# Patient Record
Sex: MALE | Race: WHITE | NOT HISPANIC OR LATINO
[De-identification: names, ages, dates, MRNs, and addresses within clinical notes are randomized per-mention and may not be internally consistent; named-entity substitution may affect disease eponyms.]

---

## 2017-05-02 ENCOUNTER — APPOINTMENT (OUTPATIENT)
Dept: ORTHOPEDIC SURGERY | Facility: CLINIC | Age: 52
End: 2017-05-02

## 2019-06-18 ENCOUNTER — NON-APPOINTMENT (OUTPATIENT)
Age: 54
End: 2019-06-18

## 2019-06-18 ENCOUNTER — APPOINTMENT (OUTPATIENT)
Dept: INTERNAL MEDICINE | Facility: CLINIC | Age: 54
End: 2019-06-18
Payer: COMMERCIAL

## 2019-06-18 VITALS
WEIGHT: 194 LBS | SYSTOLIC BLOOD PRESSURE: 132 MMHG | HEIGHT: 69 IN | DIASTOLIC BLOOD PRESSURE: 88 MMHG | OXYGEN SATURATION: 97 % | BODY MASS INDEX: 28.73 KG/M2 | TEMPERATURE: 98.2 F | RESPIRATION RATE: 14 BRPM | HEART RATE: 82 BPM

## 2019-06-18 VITALS — DIASTOLIC BLOOD PRESSURE: 84 MMHG | SYSTOLIC BLOOD PRESSURE: 126 MMHG

## 2019-06-18 DIAGNOSIS — Z82.49 FAMILY HISTORY OF ISCHEMIC HEART DISEASE AND OTHER DISEASES OF THE CIRCULATORY SYSTEM: ICD-10-CM

## 2019-06-18 DIAGNOSIS — S00.93XA CONTUSION OF UNSPECIFIED PART OF HEAD, INITIAL ENCOUNTER: ICD-10-CM
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
## 2019-06-18 DIAGNOSIS — S20.219A CONTUSION OF UNSPECIFIED FRONT WALL OF THORAX, INITIAL ENCOUNTER: ICD-10-CM

## 2019-06-18 PROCEDURE — 99204 OFFICE O/P NEW MOD 45 MIN: CPT | Mod: 25

## 2019-06-18 PROCEDURE — 93000 ELECTROCARDIOGRAM COMPLETE: CPT

## 2019-06-18 NOTE — PHYSICAL EXAM
[No Acute Distress] : no acute distress [Well Developed] : well developed [Well Nourished] : well nourished [PERRL] : pupils equal round and reactive to light [Well-Appearing] : well-appearing [Normal Sclera/Conjunctiva] : normal sclera/conjunctiva [Normal Outer Ear/Nose] : the outer ears and nose were normal in appearance [EOMI] : extraocular movements intact [Normal Oropharynx] : the oropharynx was normal [Supple] : supple [No JVD] : no jugular venous distention [Thyroid Normal, No Nodules] : the thyroid was normal and there were no nodules present [No Lymphadenopathy] : no lymphadenopathy [No Respiratory Distress] : no respiratory distress  [Scattered Wheezes] : scattered wheezing was heard [No Accessory Muscle Use] : no accessory muscle use [Normal Rate] : normal rate  [Regular Rhythm] : with a regular rhythm [Normal S1, S2] : normal S1 and S2 [No Murmur] : no murmur heard [No Carotid Bruits] : no carotid bruits [No Abdominal Bruit] : a ~M bruit was not heard ~T in the abdomen [Pedal Pulses Present] : the pedal pulses are present [No Varicosities] : no varicosities [No Extremity Clubbing/Cyanosis] : no extremity clubbing/cyanosis [No Edema] : there was no peripheral edema [No Palpable Aorta] : no palpable aorta [Non Tender] : non-tender [Soft] : abdomen soft [Non-distended] : non-distended [No HSM] : no HSM [No Masses] : no abdominal mass palpated [Normal Bowel Sounds] : normal bowel sounds [Normal Posterior Cervical Nodes] : no posterior cervical lymphadenopathy [Normal Anterior Cervical Nodes] : no anterior cervical lymphadenopathy [No CVA Tenderness] : no CVA  tenderness [No Spinal Tenderness] : no spinal tenderness [No Joint Swelling] : no joint swelling [Grossly Normal Strength/Tone] : grossly normal strength/tone [No Rash] : no rash [Normal Gait] : normal gait [Deep Tendon Reflexes (DTR)] : deep tendon reflexes were 2+ and symmetric [No Focal Deficits] : no focal deficits [Coordination Grossly Intact] : coordination grossly intact [Normal Insight/Judgement] : insight and judgment were intact [Normal Affect] : the affect was normal
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
## 2019-06-18 NOTE — HEALTH RISK ASSESSMENT
[Good] : ~his/her~ current health as good [0] : 2) Feeling down, depressed, or hopeless: Not at all (0) [] : No

## 2019-10-29 ENCOUNTER — APPOINTMENT (OUTPATIENT)
Dept: INTERNAL MEDICINE | Facility: CLINIC | Age: 54
End: 2019-10-29
Payer: COMMERCIAL

## 2019-10-29 VITALS
SYSTOLIC BLOOD PRESSURE: 114 MMHG | OXYGEN SATURATION: 98 % | DIASTOLIC BLOOD PRESSURE: 74 MMHG | HEART RATE: 76 BPM | RESPIRATION RATE: 14 BRPM | WEIGHT: 188 LBS | HEIGHT: 69 IN | BODY MASS INDEX: 27.85 KG/M2 | TEMPERATURE: 100.9 F

## 2019-10-29 DIAGNOSIS — R19.7 DIARRHEA, UNSPECIFIED: ICD-10-CM

## 2019-10-29 DIAGNOSIS — R10.814 LEFT LOWER QUADRANT ABDOMINAL TENDERNESS: ICD-10-CM

## 2019-10-29 PROCEDURE — 99214 OFFICE O/P EST MOD 30 MIN: CPT | Mod: 25

## 2019-10-29 PROCEDURE — 36415 COLL VENOUS BLD VENIPUNCTURE: CPT

## 2019-10-29 NOTE — REVIEW OF SYSTEMS
[Fever] : fever [Chills] : chills [Abdominal Pain] : abdominal pain [Diarrhea] : diarrhea [Negative] : Heme/Lymph

## 2019-10-29 NOTE — HISTORY OF PRESENT ILLNESS
[FreeTextEntry8] : has diarrhea for 2 days fever chills\par mild abdominal pains\par no traveling no antibiotics

## 2019-10-29 NOTE — PHYSICAL EXAM
[No Acute Distress] : no acute distress [Well Nourished] : well nourished [Well Developed] : well developed [Well-Appearing] : well-appearing [Normal Sclera/Conjunctiva] : normal sclera/conjunctiva [PERRL] : pupils equal round and reactive to light [EOMI] : extraocular movements intact [Normal Outer Ear/Nose] : the outer ears and nose were normal in appearance [Normal Oropharynx] : the oropharynx was normal [Normal TMs] : both tympanic membranes were normal [No JVD] : no jugular venous distention [No Lymphadenopathy] : no lymphadenopathy [Supple] : supple [Thyroid Normal, No Nodules] : the thyroid was normal and there were no nodules present [No Respiratory Distress] : no respiratory distress  [No Accessory Muscle Use] : no accessory muscle use [Clear to Auscultation] : lungs were clear to auscultation bilaterally [Normal Rate] : normal rate  [Regular Rhythm] : with a regular rhythm [Normal S1, S2] : normal S1 and S2 [No Murmur] : no murmur heard [No Carotid Bruits] : no carotid bruits [No Abdominal Bruit] : a ~M bruit was not heard ~T in the abdomen [No Varicosities] : no varicosities [Pedal Pulses Present] : the pedal pulses are present [No Edema] : there was no peripheral edema [No Palpable Aorta] : no palpable aorta [No Extremity Clubbing/Cyanosis] : no extremity clubbing/cyanosis [Soft] : abdomen soft [Non-distended] : non-distended [No Masses] : no abdominal mass palpated [No HSM] : no HSM [Normal Bowel Sounds] : normal bowel sounds [LLQ] : in the left lower quadrant [Normal Supraclavicular Nodes] : no supraclavicular lymphadenopathy [Normal Posterior Cervical Nodes] : no posterior cervical lymphadenopathy [Normal Anterior Cervical Nodes] : no anterior cervical lymphadenopathy [No CVA Tenderness] : no CVA  tenderness [No Spinal Tenderness] : no spinal tenderness [No Joint Swelling] : no joint swelling [Grossly Normal Strength/Tone] : grossly normal strength/tone [No Rash] : no rash [Coordination Grossly Intact] : coordination grossly intact [No Focal Deficits] : no focal deficits [Normal Gait] : normal gait [Deep Tendon Reflexes (DTR)] : deep tendon reflexes were 2+ and symmetric [Speech Grossly Normal] : speech grossly normal [Memory Grossly Normal] : memory grossly normal [Normal Affect] : the affect was normal [Normal Mood] : the mood was normal [Normal Insight/Judgement] : insight and judgment were intact

## 2019-10-30 LAB
ALBUMIN SERPL ELPH-MCNC: 4.3 G/DL
ALP BLD-CCNC: 61 U/L
ALT SERPL-CCNC: 16 U/L
ANION GAP SERPL CALC-SCNC: 13 MMOL/L
AST SERPL-CCNC: 18 U/L
BASOPHILS # BLD AUTO: 0.03 K/UL
BASOPHILS NFR BLD AUTO: 0.3 %
BILIRUB SERPL-MCNC: 0.3 MG/DL
BUN SERPL-MCNC: 12 MG/DL
CALCIUM SERPL-MCNC: 9.3 MG/DL
CHLORIDE SERPL-SCNC: 100 MMOL/L
CO2 SERPL-SCNC: 24 MMOL/L
CREAT SERPL-MCNC: 1.09 MG/DL
EOSINOPHIL # BLD AUTO: 0.01 K/UL
EOSINOPHIL NFR BLD AUTO: 0.1 %
GLUCOSE SERPL-MCNC: 99 MG/DL
HCT VFR BLD CALC: 46.1 %
HGB BLD-MCNC: 14.6 G/DL
IMM GRANULOCYTES NFR BLD AUTO: 0.2 %
LYMPHOCYTES # BLD AUTO: 0.99 K/UL
LYMPHOCYTES NFR BLD AUTO: 10.7 %
MAN DIFF?: NORMAL
MCHC RBC-ENTMCNC: 27.8 PG
MCHC RBC-ENTMCNC: 31.7 GM/DL
MCV RBC AUTO: 87.8 FL
MONOCYTES # BLD AUTO: 0.69 K/UL
MONOCYTES NFR BLD AUTO: 7.5 %
NEUTROPHILS # BLD AUTO: 7.51 K/UL
NEUTROPHILS NFR BLD AUTO: 81.2 %
PLATELET # BLD AUTO: 218 K/UL
POTASSIUM SERPL-SCNC: 4.5 MMOL/L
PROT SERPL-MCNC: 7.1 G/DL
RBC # BLD: 5.25 M/UL
RBC # FLD: 13.3 %
SODIUM SERPL-SCNC: 137 MMOL/L
WBC # FLD AUTO: 9.25 K/UL

## 2019-10-31 LAB
C DIFF TOX GENS STL QL NAA+PROBE: NORMAL
CDIFF BY PCR: NOT DETECTED

## 2019-11-02 ENCOUNTER — MEDICATION RENEWAL (OUTPATIENT)
Age: 54
End: 2019-11-02

## 2019-11-02 DIAGNOSIS — A04.5 CAMPYLOBACTER ENTERITIS: ICD-10-CM

## 2019-11-04 LAB — BACTERIA STL CULT: ABNORMAL

## 2019-11-12 LAB — DEPRECATED O AND P PREP STL: NORMAL

## 2022-03-27 ENCOUNTER — NON-APPOINTMENT (OUTPATIENT)
Age: 57
End: 2022-03-27

## 2022-03-28 ENCOUNTER — NON-APPOINTMENT (OUTPATIENT)
Age: 57
End: 2022-03-28

## 2022-03-28 ENCOUNTER — APPOINTMENT (OUTPATIENT)
Dept: INTERNAL MEDICINE | Facility: CLINIC | Age: 57
End: 2022-03-28
Payer: COMMERCIAL

## 2022-03-28 VITALS
DIASTOLIC BLOOD PRESSURE: 80 MMHG | OXYGEN SATURATION: 98 % | HEART RATE: 81 BPM | SYSTOLIC BLOOD PRESSURE: 118 MMHG | HEIGHT: 69 IN | RESPIRATION RATE: 14 BRPM | BODY MASS INDEX: 26.96 KG/M2 | WEIGHT: 182 LBS

## 2022-03-28 PROCEDURE — 99396 PREV VISIT EST AGE 40-64: CPT | Mod: 25

## 2022-03-28 PROCEDURE — 93000 ELECTROCARDIOGRAM COMPLETE: CPT

## 2022-03-28 RX ORDER — ACETAMINOPHEN 500 MG/1
500 TABLET ORAL
Refills: 0 | Status: DISCONTINUED | COMMUNITY

## 2022-03-28 RX ORDER — FAMOTIDINE 20 MG/1
20 TABLET, FILM COATED ORAL
Refills: 0 | Status: DISCONTINUED | COMMUNITY

## 2022-03-28 RX ORDER — METOPROLOL TARTRATE 50 MG/1
50 TABLET, FILM COATED ORAL
Refills: 0 | Status: DISCONTINUED | COMMUNITY

## 2022-03-28 RX ORDER — PAREGORIC 2 MG/5ML
LIQUID ORAL
Refills: 0 | Status: DISCONTINUED | COMMUNITY

## 2022-03-28 RX ORDER — AZITHROMYCIN 500 MG/1
500 TABLET, FILM COATED ORAL DAILY
Qty: 3 | Refills: 0 | Status: DISCONTINUED | COMMUNITY
Start: 2019-11-02 | End: 2022-03-28

## 2022-03-28 RX ORDER — CHOLESTYRAMINE 4 G/9G
4 POWDER, FOR SUSPENSION ORAL
Refills: 0 | Status: DISCONTINUED | COMMUNITY

## 2022-03-28 RX ORDER — CHLORDIAZEPOXIDE HYDROCHLORIDE AND CLIDINIUM BROMIDE 5; 2.5 MG/1; MG/1
5-2.5 CAPSULE ORAL
Refills: 0 | Status: DISCONTINUED | COMMUNITY

## 2022-03-28 RX ORDER — CEPHALEXIN 250 MG/1
250 CAPSULE ORAL
Refills: 0 | Status: DISCONTINUED | COMMUNITY

## 2022-03-28 RX ORDER — ALPRAZOLAM 1 MG/1
1 TABLET ORAL
Refills: 0 | Status: DISCONTINUED | COMMUNITY

## 2022-03-28 RX ORDER — CYCLOSPORINE 0.5 MG/ML
0.05 EMULSION OPHTHALMIC
Refills: 0 | Status: DISCONTINUED | COMMUNITY

## 2022-03-28 RX ORDER — MORPHINE SULFATE 10 MG/5 ML
20 SOLUTION, ORAL ORAL
Refills: 0 | Status: DISCONTINUED | COMMUNITY

## 2022-03-28 RX ORDER — PREGABALIN 50 MG/1
50 CAPSULE ORAL
Refills: 0 | Status: DISCONTINUED | COMMUNITY

## 2022-03-28 RX ORDER — PAROXETINE HYDROCHLORIDE 20 MG/1
20 TABLET, FILM COATED ORAL
Refills: 0 | Status: DISCONTINUED | COMMUNITY

## 2022-03-28 RX ORDER — FOLIC ACID 1 MG/1
1 TABLET ORAL
Refills: 0 | Status: DISCONTINUED | COMMUNITY

## 2022-03-28 RX ORDER — TEGASEROD 6 MG/1
6 TABLET ORAL
Refills: 0 | Status: DISCONTINUED | COMMUNITY

## 2022-03-28 RX ORDER — CAMPHOR 0.45 %
25 GEL (GRAM) TOPICAL
Refills: 0 | Status: DISCONTINUED | COMMUNITY

## 2022-03-28 RX ORDER — CHLORHEXIDINE GLUCONATE 4 %
1000 LIQUID (ML) TOPICAL
Refills: 0 | Status: DISCONTINUED | COMMUNITY

## 2022-03-28 RX ORDER — GABAPENTIN 600 MG/1
600 TABLET, COATED ORAL
Refills: 0 | Status: DISCONTINUED | COMMUNITY

## 2022-03-28 RX ORDER — LINACLOTIDE 145 UG/1
145 CAPSULE, GELATIN COATED ORAL
Refills: 0 | Status: DISCONTINUED | COMMUNITY

## 2022-03-28 RX ORDER — TIZANIDINE 2 MG/1
2 TABLET ORAL
Refills: 0 | Status: DISCONTINUED | COMMUNITY

## 2022-03-28 RX ORDER — VALSARTAN 160 MG/1
160 TABLET, COATED ORAL
Refills: 0 | Status: DISCONTINUED | COMMUNITY

## 2022-03-28 RX ORDER — ONDANSETRON HYDROCHLORIDE 4 MG/1
4 TABLET, FILM COATED ORAL
Refills: 0 | Status: DISCONTINUED | COMMUNITY

## 2022-03-28 RX ORDER — AMINO ACIDS/PROTEIN HYDROLYS 15G-100/30
LIQUID (ML) ORAL
Refills: 0 | Status: DISCONTINUED | COMMUNITY

## 2022-03-28 RX ORDER — THIAMINE HCL 100 MG
500 TABLET ORAL
Refills: 0 | Status: DISCONTINUED | COMMUNITY

## 2022-03-28 NOTE — HISTORY OF PRESENT ILLNESS
[FreeTextEntry1] : Here to reestablish care [de-identified] : MARIA EMAT CUNHA is a 57 year old M who presents today for annual physical

## 2022-03-28 NOTE — HEALTH RISK ASSESSMENT
[Good] : ~his/her~  mood as  good [Never] : Never [Yes] : Yes [Monthly or less (1 pt)] : Monthly or less (1 point) [1 or 2 (0 pts)] : 1 or 2 (0 points) [Never (0 pts)] : Never (0 points) [No] : In the past 12 months have you used drugs other than those required for medical reasons? No [No falls in past year] : Patient reported no falls in the past year [0] : 2) Feeling down, depressed, or hopeless: Not at all (0) [Patient refused screening] : Patient refused screening [PHQ-2 Negative - No further assessment needed] : PHQ-2 Negative - No further assessment needed [[id number]] : 0

## 2022-03-28 NOTE — PHYSICAL EXAM
[No Acute Distress] : no acute distress [Well Nourished] : well nourished [Well Developed] : well developed [Well-Appearing] : well-appearing [Normal Voice/Communication] : normal voice/communication [Normal Sclera/Conjunctiva] : normal sclera/conjunctiva [PERRL] : pupils equal round and reactive to light [EOMI] : extraocular movements intact [Normal Outer Ear/Nose] : the outer ears and nose were normal in appearance [Normal Oropharynx] : the oropharynx was normal [Normal TMs] : both tympanic membranes were normal [No JVD] : no jugular venous distention [No Lymphadenopathy] : no lymphadenopathy [Supple] : supple [Thyroid Normal, No Nodules] : the thyroid was normal and there were no nodules present [No Respiratory Distress] : no respiratory distress  [No Accessory Muscle Use] : no accessory muscle use [Clear to Auscultation] : lungs were clear to auscultation bilaterally [Normal Rate] : normal rate  [Regular Rhythm] : with a regular rhythm [Normal S1, S2] : normal S1 and S2 [No Murmur] : no murmur heard [No Carotid Bruits] : no carotid bruits [No Abdominal Bruit] : a ~M bruit was not heard ~T in the abdomen [No Varicosities] : no varicosities [Pedal Pulses Present] : the pedal pulses are present [No Edema] : there was no peripheral edema [No Palpable Aorta] : no palpable aorta [No Extremity Clubbing/Cyanosis] : no extremity clubbing/cyanosis [Normal Appearance] : normal in appearance [Soft] : abdomen soft [Non Tender] : non-tender [Non-distended] : non-distended [No Masses] : no abdominal mass palpated [No HSM] : no HSM [Normal Bowel Sounds] : normal bowel sounds [No Hernias] : no hernias [Normal Sphincter Tone] : normal sphincter tone [No Mass] : no mass [Penis Abnormality] : normal uncircumcised penis [Scrotum] : the scrotum was normal [Testes Tenderness] : no tenderness of the testes [Testes Mass (___cm)] : there were no testicular masses [Prostate Enlargement] : the prostate was not enlarged [Prostate Tenderness] : the prostate was not tender [No Prostate Nodules] : no prostate nodules [Normal Axillary Nodes] : no axillary lymphadenopathy [Normal Posterior Cervical Nodes] : no posterior cervical lymphadenopathy [Normal Anterior Cervical Nodes] : no anterior cervical lymphadenopathy [Normal Inguinal Nodes] : no inguinal lymphadenopathy [Normal Femoral Nodes] : no femoral lymphadenopathy [No CVA Tenderness] : no CVA  tenderness [No Spinal Tenderness] : no spinal tenderness [No Joint Swelling] : no joint swelling [Grossly Normal Strength/Tone] : grossly normal strength/tone [No Rash] : no rash [Coordination Grossly Intact] : coordination grossly intact [No Focal Deficits] : no focal deficits [Normal Gait] : normal gait [Deep Tendon Reflexes (DTR)] : deep tendon reflexes were 2+ and symmetric [Speech Grossly Normal] : speech grossly normal [Memory Grossly Normal] : memory grossly normal [Normal Affect] : the affect was normal [Alert and Oriented x3] : oriented to person, place, and time [Normal Mood] : the mood was normal [Normal Insight/Judgement] : insight and judgment were intact

## 2022-03-29 DIAGNOSIS — E55.9 VITAMIN D DEFICIENCY, UNSPECIFIED: ICD-10-CM

## 2022-03-29 LAB
25(OH)D3 SERPL-MCNC: 27.6 NG/ML
ALBUMIN SERPL ELPH-MCNC: 4.7 G/DL
ALP BLD-CCNC: 62 U/L
ALT SERPL-CCNC: 27 U/L
ANION GAP SERPL CALC-SCNC: 11 MMOL/L
APPEARANCE: CLEAR
AST SERPL-CCNC: 20 U/L
BACTERIA: NEGATIVE
BASOPHILS # BLD AUTO: 0.05 K/UL
BASOPHILS NFR BLD AUTO: 1.4 %
BILIRUB SERPL-MCNC: 0.7 MG/DL
BILIRUBIN URINE: NEGATIVE
BLOOD URINE: NEGATIVE
BUN SERPL-MCNC: 14 MG/DL
CALCIUM SERPL-MCNC: 9.5 MG/DL
CHLORIDE SERPL-SCNC: 103 MMOL/L
CHOLEST SERPL-MCNC: 266 MG/DL
CK SERPL-CCNC: 116 U/L
CO2 SERPL-SCNC: 28 MMOL/L
COLOR: YELLOW
CREAT SERPL-MCNC: 0.82 MG/DL
EGFR: 104 ML/MIN/1.73M2
EOSINOPHIL # BLD AUTO: 0.07 K/UL
EOSINOPHIL NFR BLD AUTO: 1.9 %
ESTIMATED AVERAGE GLUCOSE: 105 MG/DL
GLUCOSE QUALITATIVE U: NEGATIVE
GLUCOSE SERPL-MCNC: 88 MG/DL
HBA1C MFR BLD HPLC: 5.3 %
HCT VFR BLD CALC: 45.9 %
HDLC SERPL-MCNC: 61 MG/DL
HEMOCCULT STL QL IA: NEGATIVE
HGB BLD-MCNC: 15.2 G/DL
HYALINE CASTS: 0 /LPF
IMM GRANULOCYTES NFR BLD AUTO: 0 %
KETONES URINE: NEGATIVE
LDLC SERPL CALC-MCNC: 186 MG/DL
LEUKOCYTE ESTERASE URINE: NEGATIVE
LYMPHOCYTES # BLD AUTO: 0.99 K/UL
LYMPHOCYTES NFR BLD AUTO: 26.8 %
MAGNESIUM SERPL-MCNC: 2.3 MG/DL
MAN DIFF?: NORMAL
MCHC RBC-ENTMCNC: 28.8 PG
MCHC RBC-ENTMCNC: 33.1 GM/DL
MCV RBC AUTO: 86.9 FL
MICROSCOPIC-UA: NORMAL
MONOCYTES # BLD AUTO: 0.32 K/UL
MONOCYTES NFR BLD AUTO: 8.6 %
NEUTROPHILS # BLD AUTO: 2.27 K/UL
NEUTROPHILS NFR BLD AUTO: 61.3 %
NITRITE URINE: NEGATIVE
NONHDLC SERPL-MCNC: 206 MG/DL
PH URINE: 7.5
PLATELET # BLD AUTO: 238 K/UL
POTASSIUM SERPL-SCNC: 4.4 MMOL/L
PROT SERPL-MCNC: 7.3 G/DL
PROTEIN URINE: NEGATIVE
PSA SERPL-MCNC: 1.4 NG/ML
RBC # BLD: 5.28 M/UL
RBC # FLD: 13.1 %
RED BLOOD CELLS URINE: 1 /HPF
SODIUM SERPL-SCNC: 142 MMOL/L
SPECIFIC GRAVITY URINE: 1.02
SQUAMOUS EPITHELIAL CELLS: 0 /HPF
TESTOST SERPL-MCNC: 395 NG/DL
TRIGL SERPL-MCNC: 99 MG/DL
TSH SERPL-ACNC: 0.99 UIU/ML
UROBILINOGEN URINE: NORMAL
WBC # FLD AUTO: 3.7 K/UL
WHITE BLOOD CELLS URINE: 1 /HPF

## 2022-03-29 RX ORDER — CHROMIUM 200 MCG
25 MCG TABLET ORAL
Refills: 0 | Status: ACTIVE | COMMUNITY

## 2022-03-31 LAB — ZINC SERPL-MCNC: 110 UG/DL

## 2022-06-28 ENCOUNTER — RX RENEWAL (OUTPATIENT)
Age: 57
End: 2022-06-28

## 2022-08-15 DIAGNOSIS — D72.819 DECREASED WHITE BLOOD CELL COUNT, UNSPECIFIED: ICD-10-CM

## 2022-08-15 LAB
25(OH)D3 SERPL-MCNC: 77.3 NG/ML
ALBUMIN SERPL ELPH-MCNC: 4.6 G/DL
ALP BLD-CCNC: 62 U/L
ALT SERPL-CCNC: 23 U/L
ANION GAP SERPL CALC-SCNC: 11 MMOL/L
AST SERPL-CCNC: 19 U/L
BASOPHILS # BLD AUTO: 0.04 K/UL
BASOPHILS NFR BLD AUTO: 1.1 %
BILIRUB SERPL-MCNC: 0.7 MG/DL
BUN SERPL-MCNC: 12 MG/DL
CALCIUM SERPL-MCNC: 9.4 MG/DL
CHLORIDE SERPL-SCNC: 104 MMOL/L
CHOLEST SERPL-MCNC: 227 MG/DL
CK SERPL-CCNC: 110 U/L
CO2 SERPL-SCNC: 27 MMOL/L
CREAT SERPL-MCNC: 0.8 MG/DL
EGFR: 105 ML/MIN/1.73M2
EOSINOPHIL # BLD AUTO: 0.14 K/UL
EOSINOPHIL NFR BLD AUTO: 3.8 %
GLUCOSE SERPL-MCNC: 89 MG/DL
HCT VFR BLD CALC: 47.9 %
HDLC SERPL-MCNC: 56 MG/DL
HGB BLD-MCNC: 15.8 G/DL
IMM GRANULOCYTES NFR BLD AUTO: 0.3 %
LDLC SERPL CALC-MCNC: 151 MG/DL
LYMPHOCYTES # BLD AUTO: 1.07 K/UL
LYMPHOCYTES NFR BLD AUTO: 28.9 %
MAN DIFF?: NORMAL
MCHC RBC-ENTMCNC: 28.8 PG
MCHC RBC-ENTMCNC: 33 GM/DL
MCV RBC AUTO: 87.4 FL
MONOCYTES # BLD AUTO: 0.31 K/UL
MONOCYTES NFR BLD AUTO: 8.4 %
NEUTROPHILS # BLD AUTO: 2.13 K/UL
NEUTROPHILS NFR BLD AUTO: 57.5 %
NONHDLC SERPL-MCNC: 171 MG/DL
PLATELET # BLD AUTO: 238 K/UL
POTASSIUM SERPL-SCNC: 4.1 MMOL/L
PROT SERPL-MCNC: 7.1 G/DL
RBC # BLD: 5.48 M/UL
RBC # FLD: 12.3 %
SODIUM SERPL-SCNC: 142 MMOL/L
TRIGL SERPL-MCNC: 97 MG/DL
WBC # FLD AUTO: 3.7 K/UL

## 2023-04-24 ENCOUNTER — APPOINTMENT (OUTPATIENT)
Dept: INTERNAL MEDICINE | Facility: CLINIC | Age: 58
End: 2023-04-24
Payer: COMMERCIAL

## 2023-04-24 ENCOUNTER — NON-APPOINTMENT (OUTPATIENT)
Age: 58
End: 2023-04-24

## 2023-04-24 VITALS
RESPIRATION RATE: 14 BRPM | OXYGEN SATURATION: 95 % | SYSTOLIC BLOOD PRESSURE: 116 MMHG | HEIGHT: 69 IN | TEMPERATURE: 98.9 F | DIASTOLIC BLOOD PRESSURE: 74 MMHG | HEART RATE: 72 BPM | BODY MASS INDEX: 27.55 KG/M2 | WEIGHT: 186 LBS

## 2023-04-24 DIAGNOSIS — Z00.00 ENCOUNTER FOR GENERAL ADULT MEDICAL EXAMINATION W/OUT ABNORMAL FINDINGS: ICD-10-CM

## 2023-04-24 DIAGNOSIS — Z12.11 ENCOUNTER FOR SCREENING FOR MALIGNANT NEOPLASM OF COLON: ICD-10-CM

## 2023-04-24 PROCEDURE — 93000 ELECTROCARDIOGRAM COMPLETE: CPT | Mod: 59

## 2023-04-24 PROCEDURE — 99396 PREV VISIT EST AGE 40-64: CPT | Mod: 25

## 2023-04-24 NOTE — PLAN
[FreeTextEntry1] : continue medications \par further instructions pending lab results \par advised to get a Colonoscopy

## 2023-04-24 NOTE — HISTORY OF PRESENT ILLNESS
[FreeTextEntry1] : annual physical  [de-identified] : URSULA CUNHA is a 58 year old M who presents today for a physical. Pt has a history of HLD. Pt ran out and stopped taking cholesterol medications noticed blood after ejaculation

## 2023-04-24 NOTE — END OF VISIT
[FreeTextEntry3] : "I, Ashley Camacho, personally scribed the services dictated to me by Dr. Alfredo Paulson MD in this documentation on 04/24/2023 " \par \par "I Dr. Alfredo Paulson MD, personally performed the services described in this documentation on 04/24/2023 for the patient as scribed by Ashley Camacho in my presence. I have reviewed and verified that all the information is accurate and true."

## 2023-04-24 NOTE — PHYSICAL EXAM
[No Acute Distress] : no acute distress [Well Nourished] : well nourished [Well Developed] : well developed [Well-Appearing] : well-appearing [Normal Voice/Communication] : normal voice/communication [Normal Sclera/Conjunctiva] : normal sclera/conjunctiva [PERRL] : pupils equal round and reactive to light [EOMI] : extraocular movements intact [Normal Outer Ear/Nose] : the outer ears and nose were normal in appearance [Normal Oropharynx] : the oropharynx was normal [No JVD] : no jugular venous distention [No Lymphadenopathy] : no lymphadenopathy [Supple] : supple [Thyroid Normal, No Nodules] : the thyroid was normal and there were no nodules present [No Respiratory Distress] : no respiratory distress  [No Accessory Muscle Use] : no accessory muscle use [Clear to Auscultation] : lungs were clear to auscultation bilaterally [Normal Rate] : normal rate  [Regular Rhythm] : with a regular rhythm [Normal S1, S2] : normal S1 and S2 [No Murmur] : no murmur heard [No Carotid Bruits] : no carotid bruits [No Abdominal Bruit] : a ~M bruit was not heard ~T in the abdomen [No Varicosities] : no varicosities [Pedal Pulses Present] : the pedal pulses are present [No Edema] : there was no peripheral edema [No Palpable Aorta] : no palpable aorta [No Extremity Clubbing/Cyanosis] : no extremity clubbing/cyanosis [Soft] : abdomen soft [Non Tender] : non-tender [Non-distended] : non-distended [No HSM] : no HSM [Normal Bowel Sounds] : normal bowel sounds [Normal Supraclavicular Nodes] : no supraclavicular lymphadenopathy [Normal Posterior Cervical Nodes] : no posterior cervical lymphadenopathy [Normal Anterior Cervical Nodes] : no anterior cervical lymphadenopathy [No CVA Tenderness] : no CVA  tenderness [No Joint Swelling] : no joint swelling [No Spinal Tenderness] : no spinal tenderness [Grossly Normal Strength/Tone] : grossly normal strength/tone [Coordination Grossly Intact] : coordination grossly intact [No Rash] : no rash [No Focal Deficits] : no focal deficits [Normal Gait] : normal gait [Speech Grossly Normal] : speech grossly normal [Deep Tendon Reflexes (DTR)] : deep tendon reflexes were 2+ and symmetric [Memory Grossly Normal] : memory grossly normal [Normal Affect] : the affect was normal [Alert and Oriented x3] : oriented to person, place, and time [Normal Mood] : the mood was normal [Normal Insight/Judgement] : insight and judgment were intact [Normal Appearance] : normal in appearance [No Masses] : no palpable masses [No Hernias] : no hernias [Normal Sphincter Tone] : normal sphincter tone [No Mass] : no mass [Penis Abnormality] : normal circumcised penis [Scrotum] : the scrotum was normal [Testes Tenderness] : no tenderness of the testes [Testes Mass (___cm)] : there were no testicular masses [Prostate Enlargement] : the prostate was not enlarged [Prostate Tenderness] : the prostate was not tender [No Prostate Nodules] : no prostate nodules [Normal Axillary Nodes] : no axillary lymphadenopathy [Normal Inguinal Nodes] : no inguinal lymphadenopathy [Normal Femoral Nodes] : no femoral lymphadenopathy

## 2023-04-24 NOTE — HEALTH RISK ASSESSMENT
[Good] : ~his/her~  mood as  good [2 - 4 times a month (2 pts)] : 2-4 times a month (2 points) [1 or 2 (0 pts)] : 1 or 2 (0 points) [Never (0 pts)] : Never (0 points) [No] : In the past 12 months have you used drugs other than those required for medical reasons? No [No falls in past year] : Patient reported no falls in the past year [0] : 2) Feeling down, depressed, or hopeless: Not at all (0) [PHQ-2 Negative - No further assessment needed] : PHQ-2 Negative - No further assessment needed [Never] : Never [[id number]] : 0

## 2023-04-26 LAB
25(OH)D3 SERPL-MCNC: 87.6 NG/ML
ALBUMIN SERPL ELPH-MCNC: 4.6 G/DL
ALP BLD-CCNC: 63 U/L
ALT SERPL-CCNC: 19 U/L
ANION GAP SERPL CALC-SCNC: 11 MMOL/L
APPEARANCE: CLEAR
AST SERPL-CCNC: 21 U/L
BACTERIA: NEGATIVE /HPF
BASOPHILS # BLD AUTO: 0.06 K/UL
BASOPHILS NFR BLD AUTO: 1.4 %
BILIRUB SERPL-MCNC: 0.8 MG/DL
BILIRUBIN URINE: NEGATIVE
BLOOD URINE: NEGATIVE
BUN SERPL-MCNC: 14 MG/DL
CALCIUM SERPL-MCNC: 9.8 MG/DL
CAST: 0 /LPF
CHLORIDE SERPL-SCNC: 104 MMOL/L
CHOLEST SERPL-MCNC: 294 MG/DL
CK SERPL-CCNC: 162 U/L
CO2 SERPL-SCNC: 27 MMOL/L
COLOR: NORMAL
CREAT SERPL-MCNC: 0.93 MG/DL
EGFR: 96 ML/MIN/1.73M2
EOSINOPHIL # BLD AUTO: 0.15 K/UL
EOSINOPHIL NFR BLD AUTO: 3.6 %
EPITHELIAL CELLS: 0 /HPF
ESTIMATED AVERAGE GLUCOSE: 105 MG/DL
GLUCOSE QUALITATIVE U: NEGATIVE MG/DL
GLUCOSE SERPL-MCNC: 101 MG/DL
HBA1C MFR BLD HPLC: 5.3 %
HCT VFR BLD CALC: 49.2 %
HDLC SERPL-MCNC: 64 MG/DL
HEMOCCULT STL QL IA: NEGATIVE
HGB BLD-MCNC: 15.5 G/DL
IMM GRANULOCYTES NFR BLD AUTO: 0.2 %
KETONES URINE: ABNORMAL MG/DL
LDLC SERPL CALC-MCNC: 206 MG/DL
LEUKOCYTE ESTERASE URINE: ABNORMAL
LYMPHOCYTES # BLD AUTO: 1.19 K/UL
LYMPHOCYTES NFR BLD AUTO: 28.7 %
MAN DIFF?: NORMAL
MCHC RBC-ENTMCNC: 28.2 PG
MCHC RBC-ENTMCNC: 31.5 GM/DL
MCV RBC AUTO: 89.5 FL
MICROSCOPIC-UA: NORMAL
MONOCYTES # BLD AUTO: 0.39 K/UL
MONOCYTES NFR BLD AUTO: 9.4 %
NEUTROPHILS # BLD AUTO: 2.34 K/UL
NEUTROPHILS NFR BLD AUTO: 56.7 %
NITRITE URINE: NEGATIVE
NONHDLC SERPL-MCNC: 230 MG/DL
PH URINE: 6
PLATELET # BLD AUTO: 259 K/UL
POTASSIUM SERPL-SCNC: 5.2 MMOL/L
PROT SERPL-MCNC: 7.2 G/DL
PROTEIN URINE: NORMAL MG/DL
PSA SERPL-MCNC: 1.6 NG/ML
RBC # BLD: 5.5 M/UL
RBC # FLD: 12.9 %
RED BLOOD CELLS URINE: 1 /HPF
SODIUM SERPL-SCNC: 141 MMOL/L
SPECIFIC GRAVITY URINE: 1.03
TRIGL SERPL-MCNC: 119 MG/DL
TSH SERPL-ACNC: 0.95 UIU/ML
UROBILINOGEN URINE: 0.2 MG/DL
WBC # FLD AUTO: 4.14 K/UL
WHITE BLOOD CELLS URINE: 2 /HPF

## 2023-10-01 RX ORDER — ATORVASTATIN CALCIUM 10 MG/1
10 TABLET, FILM COATED ORAL DAILY
Qty: 30 | Refills: 1 | Status: ACTIVE | COMMUNITY
Start: 2022-08-15 | End: 1900-01-01

## 2024-03-16 ENCOUNTER — EMERGENCY (EMERGENCY)
Facility: HOSPITAL | Age: 59
LOS: 1 days | Discharge: HOME | End: 2024-03-16
Attending: EMERGENCY MEDICINE | Admitting: EMERGENCY MEDICINE
Payer: COMMERCIAL

## 2024-03-16 VITALS
SYSTOLIC BLOOD PRESSURE: 133 MMHG | OXYGEN SATURATION: 94 % | RESPIRATION RATE: 20 BRPM | WEIGHT: 179.9 LBS | DIASTOLIC BLOOD PRESSURE: 90 MMHG | HEART RATE: 104 BPM | HEIGHT: 70 IN

## 2024-03-16 VITALS
TEMPERATURE: 99 F | OXYGEN SATURATION: 97 % | HEART RATE: 75 BPM | DIASTOLIC BLOOD PRESSURE: 96 MMHG | SYSTOLIC BLOOD PRESSURE: 145 MMHG | RESPIRATION RATE: 18 BRPM

## 2024-03-16 LAB
ALBUMIN SERPL ELPH-MCNC: 3.5 G/DL — SIGNIFICANT CHANGE UP (ref 3.3–5)
ALP SERPL-CCNC: 60 U/L — SIGNIFICANT CHANGE UP (ref 40–120)
ALT FLD-CCNC: 42 U/L — SIGNIFICANT CHANGE UP (ref 12–78)
ANION GAP SERPL CALC-SCNC: 8 MMOL/L — SIGNIFICANT CHANGE UP (ref 5–17)
APTT BLD: 27.3 SEC — SIGNIFICANT CHANGE UP (ref 24.5–35.6)
AST SERPL-CCNC: 27 U/L — SIGNIFICANT CHANGE UP (ref 15–37)
BASOPHILS # BLD AUTO: 0.03 K/UL — SIGNIFICANT CHANGE UP (ref 0–0.2)
BASOPHILS NFR BLD AUTO: 0.4 % — SIGNIFICANT CHANGE UP (ref 0–2)
BILIRUB SERPL-MCNC: 0.4 MG/DL — SIGNIFICANT CHANGE UP (ref 0.2–1.2)
BUN SERPL-MCNC: 17 MG/DL — SIGNIFICANT CHANGE UP (ref 7–23)
CALCIUM SERPL-MCNC: 9.2 MG/DL — SIGNIFICANT CHANGE UP (ref 8.5–10.1)
CHLORIDE SERPL-SCNC: 106 MMOL/L — SIGNIFICANT CHANGE UP (ref 96–108)
CO2 SERPL-SCNC: 29 MMOL/L — SIGNIFICANT CHANGE UP (ref 22–31)
CREAT SERPL-MCNC: 1 MG/DL — SIGNIFICANT CHANGE UP (ref 0.5–1.3)
EGFR: 88 ML/MIN/1.73M2 — SIGNIFICANT CHANGE UP
EOSINOPHIL # BLD AUTO: 0.01 K/UL — SIGNIFICANT CHANGE UP (ref 0–0.5)
EOSINOPHIL NFR BLD AUTO: 0.1 % — SIGNIFICANT CHANGE UP (ref 0–6)
GLUCOSE SERPL-MCNC: 92 MG/DL — SIGNIFICANT CHANGE UP (ref 70–99)
HCT VFR BLD CALC: 45.6 % — SIGNIFICANT CHANGE UP (ref 39–50)
HGB BLD-MCNC: 14.9 G/DL — SIGNIFICANT CHANGE UP (ref 13–17)
IMM GRANULOCYTES NFR BLD AUTO: 0.1 % — SIGNIFICANT CHANGE UP (ref 0–0.9)
INR BLD: 1.04 RATIO — SIGNIFICANT CHANGE UP (ref 0.85–1.18)
LACTATE SERPL-SCNC: 1.3 MMOL/L — SIGNIFICANT CHANGE UP (ref 0.7–2)
LYMPHOCYTES # BLD AUTO: 1.29 K/UL — SIGNIFICANT CHANGE UP (ref 1–3.3)
LYMPHOCYTES # BLD AUTO: 18.7 % — SIGNIFICANT CHANGE UP (ref 13–44)
MCHC RBC-ENTMCNC: 28.2 PG — SIGNIFICANT CHANGE UP (ref 27–34)
MCHC RBC-ENTMCNC: 32.7 GM/DL — SIGNIFICANT CHANGE UP (ref 32–36)
MCV RBC AUTO: 86.4 FL — SIGNIFICANT CHANGE UP (ref 80–100)
MONOCYTES # BLD AUTO: 0.76 K/UL — SIGNIFICANT CHANGE UP (ref 0–0.9)
MONOCYTES NFR BLD AUTO: 11 % — SIGNIFICANT CHANGE UP (ref 2–14)
NEUTROPHILS # BLD AUTO: 4.81 K/UL — SIGNIFICANT CHANGE UP (ref 1.8–7.4)
NEUTROPHILS NFR BLD AUTO: 69.7 % — SIGNIFICANT CHANGE UP (ref 43–77)
NRBC # BLD: 0 /100 WBCS — SIGNIFICANT CHANGE UP (ref 0–0)
PLATELET # BLD AUTO: 208 K/UL — SIGNIFICANT CHANGE UP (ref 150–400)
POTASSIUM SERPL-MCNC: 3.8 MMOL/L — SIGNIFICANT CHANGE UP (ref 3.5–5.3)
POTASSIUM SERPL-SCNC: 3.8 MMOL/L — SIGNIFICANT CHANGE UP (ref 3.5–5.3)
PROT SERPL-MCNC: 7.7 G/DL — SIGNIFICANT CHANGE UP (ref 6–8.3)
PROTHROM AB SERPL-ACNC: 12.2 SEC — SIGNIFICANT CHANGE UP (ref 9.5–13)
RAPID RVP RESULT: SIGNIFICANT CHANGE UP
RBC # BLD: 5.28 M/UL — SIGNIFICANT CHANGE UP (ref 4.2–5.8)
RBC # FLD: 12.5 % — SIGNIFICANT CHANGE UP (ref 10.3–14.5)
SARS-COV-2 RNA SPEC QL NAA+PROBE: SIGNIFICANT CHANGE UP
SODIUM SERPL-SCNC: 143 MMOL/L — SIGNIFICANT CHANGE UP (ref 135–145)
WBC # BLD: 6.91 K/UL — SIGNIFICANT CHANGE UP (ref 3.8–10.5)
WBC # FLD AUTO: 6.91 K/UL — SIGNIFICANT CHANGE UP (ref 3.8–10.5)

## 2024-03-16 PROCEDURE — 36415 COLL VENOUS BLD VENIPUNCTURE: CPT

## 2024-03-16 PROCEDURE — 87186 SC STD MICRODIL/AGAR DIL: CPT

## 2024-03-16 PROCEDURE — 71260 CT THORAX DX C+: CPT | Mod: 26,MC

## 2024-03-16 PROCEDURE — 96374 THER/PROPH/DIAG INJ IV PUSH: CPT | Mod: XU

## 2024-03-16 PROCEDURE — 85025 COMPLETE CBC W/AUTO DIFF WBC: CPT

## 2024-03-16 PROCEDURE — 0225U NFCT DS DNA&RNA 21 SARSCOV2: CPT

## 2024-03-16 PROCEDURE — 96375 TX/PRO/DX INJ NEW DRUG ADDON: CPT | Mod: XU

## 2024-03-16 PROCEDURE — 99285 EMERGENCY DEPT VISIT HI MDM: CPT

## 2024-03-16 PROCEDURE — 80053 COMPREHEN METABOLIC PANEL: CPT

## 2024-03-16 PROCEDURE — 85730 THROMBOPLASTIN TIME PARTIAL: CPT

## 2024-03-16 PROCEDURE — 85610 PROTHROMBIN TIME: CPT

## 2024-03-16 PROCEDURE — 99284 EMERGENCY DEPT VISIT MOD MDM: CPT | Mod: 25

## 2024-03-16 PROCEDURE — 83605 ASSAY OF LACTIC ACID: CPT

## 2024-03-16 PROCEDURE — 87040 BLOOD CULTURE FOR BACTERIA: CPT

## 2024-03-16 PROCEDURE — 71260 CT THORAX DX C+: CPT | Mod: MC

## 2024-03-16 PROCEDURE — 87150 DNA/RNA AMPLIFIED PROBE: CPT

## 2024-03-16 RX ORDER — ACETAMINOPHEN 500 MG
1000 TABLET ORAL ONCE
Refills: 0 | Status: DISCONTINUED | OUTPATIENT
Start: 2024-03-16 | End: 2024-03-16

## 2024-03-16 RX ORDER — SODIUM CHLORIDE 9 MG/ML
2500 INJECTION INTRAMUSCULAR; INTRAVENOUS; SUBCUTANEOUS ONCE
Refills: 0 | Status: COMPLETED | OUTPATIENT
Start: 2024-03-16 | End: 2024-03-16

## 2024-03-16 RX ORDER — AZITHROMYCIN 500 MG/1
500 TABLET, FILM COATED ORAL ONCE
Refills: 0 | Status: COMPLETED | OUTPATIENT
Start: 2024-03-16 | End: 2024-03-16

## 2024-03-16 RX ORDER — CEFTRIAXONE 500 MG/1
1000 INJECTION, POWDER, FOR SOLUTION INTRAMUSCULAR; INTRAVENOUS ONCE
Refills: 0 | Status: COMPLETED | OUTPATIENT
Start: 2024-03-16 | End: 2024-03-16

## 2024-03-16 RX ADMIN — AZITHROMYCIN 255 MILLIGRAM(S): 500 TABLET, FILM COATED ORAL at 22:05

## 2024-03-16 RX ADMIN — SODIUM CHLORIDE 2500 MILLILITER(S): 9 INJECTION INTRAMUSCULAR; INTRAVENOUS; SUBCUTANEOUS at 20:18

## 2024-03-16 RX ADMIN — CEFTRIAXONE 100 MILLIGRAM(S): 500 INJECTION, POWDER, FOR SOLUTION INTRAMUSCULAR; INTRAVENOUS at 20:17

## 2024-03-16 NOTE — ED PROVIDER NOTE - PATIENT PORTAL LINK FT
You can access the FollowMyHealth Patient Portal offered by [facility] by registering at the following website: [email or web address]. By joining [facility]’s FollowMyHealth portal, you will also be able to view your health information using other applications (apps) compatible with our system.

## 2024-03-16 NOTE — ED PROVIDER NOTE - NSFOLLOWUPINSTRUCTIONS_ED_ALL_ED_FT
WHAT YOU NEED TO KNOW:    A fever is an increase in your body temperature. Normal body temperature is 98.6°F (37°C). Fever is generally defined as greater than 100.4°F (38°C). Common causes include an infection, injury, or disease such as arthritis.    DISCHARGE INSTRUCTIONS:    Return to the emergency department if:    Your fever does not go away or gets worse even after treatment.    You have a stiff neck and a bad headache.    You are confused. You may not be able to think clearly or remember things like you normally do.    Your heart beats faster than usual even after treatment.    You have shortness of breath or chest pain when you breathe.    You urinate small amounts or not at all.    Your skin, lips, or nails turn blue.  Contact your healthcare provider if:    You have abdominal pain or you feel bloated.    You have nausea or are vomiting.    You have pain or burning when you urinate, or you have pain in your back.    You have questions or concerns about your condition or care.  Medicines: You may need any of the following:    NSAIDs, such as ibuprofen, help decrease swelling, pain, and fever. This medicine is available with or without a doctor's order. NSAIDs can cause stomach bleeding or kidney problems in certain people. If you take blood thinner medicine, always ask if NSAIDs are safe for you. Always read the medicine label and follow directions. Do not give these medicines to children younger than 6 months without direction from a healthcare provider.    Acetaminophen decreases pain and fever. It is available without a doctor's order. Ask how much to take and how often to take it. Follow directions. Read the labels of all other medicines you are using to see if they also contain acetaminophen, or ask your doctor or pharmacist. Acetaminophen can cause liver damage if not taken correctly.    Antibiotics may be given if you have an infection caused by bacteria.    Take your medicine as directed. Contact your healthcare provider if you think your medicine is not helping or if you have side effects. Tell your provider if you are allergic to any medicine. Keep a list of the medicines, vitamins, and herbs you take. Include the amounts, and when and why you take them. Bring the list or the pill bottles to follow-up visits. Carry your medicine list with you in case of an emergency.  Follow up with your healthcare provider as directed: Write down your questions so you remember to ask them during your visits.    Self-care:    Drink more liquids as directed. A fever makes you sweat. This can increase your risk for dehydration. Liquids can help prevent dehydration.  Drink at least 6 to 8 eight-ounce cups of clear liquids each day. Drink water, juice, or broth. Do not drink sports drinks. They may contain caffeine.    Ask your healthcare provider if you should drink an oral rehydration solution (ORS). An ORS has the right amounts of water, salts, and sugar you need to replace body fluids.    Dress in lightweight clothes. Shivers may be a sign that your fever is rising. Do not put extra blankets or clothes on. This may cause your fever to rise even higher. Dress in light, comfortable clothing. Use a lightweight blanket or sheet when you sleep. Change your clothes, blanket, or sheets if they get wet.    Cool yourself safely. Take a bath in cool or lukewarm water. Use an ice pack wrapped in a small towel or wet a washcloth with cool water. Place the ice pack or wet washcloth on your forehead or the back of your neck.

## 2024-03-16 NOTE — ED PROVIDER NOTE - CONSTITUTIONAL, MLM
normal... Well appearing, awake, alert, oriented to person, place, time/situation and in no apparent distress. nontoxic

## 2024-03-16 NOTE — ED PROVIDER NOTE - CARE PROVIDERS DIRECT ADDRESSES
[email or web address]

## 2024-03-16 NOTE — ED PROVIDER NOTE - IV ALTEPLASE INCLUSION HIDDEN
infant born at 32 5/7 weeks with respiratory distress requiring CPAP. See [facility]ant to be transferred to [facility] for continuation of care due to prematurity and respiratory failure.  Electronically signed by JENN Menjivar CNP on 2024 at 12:36 PM   
show

## 2024-03-16 NOTE — ED ADULT NURSE NOTE - OBJECTIVE STATEMENT
Pt to ED c/c fever since Tuesday. Pt denies pain, cough, SOB, urinary symptoms. Pt states he has no symptoms other than fever. Lungs clear, gait steady.

## 2024-03-16 NOTE — ED ADULT TRIAGE NOTE - NSWEIGHTCALCTOOLDRUG_GEN_A_CORE
Initial Anesthesia Post-op Note    Patient: Derrick Sen  Procedure(s) Performed: BILATERAL TOTAL KNEE ARTHROPLASTY  Anesthesia type: Spinal    Vital Last Value   Temperature 36.6 Â°C (97.8 Â°F) (04/25/17 0817)   Pulse 70 (04/25/17 0817)   Respiratory Rate 16 (04/25/17 0817)   Non-Invasive   Blood Pressure 148/72 (04/25/17 0817)   Arterial  Blood Pressure     Pulse Oximetry 97 % (04/25/17 0817)     Last 24 I/O:   Intake/Output Summary (Last 24 hours) at 04/25/17 1353  Last data filed at 04/25/17 1347   Gross per 24 hour   Intake             2100 ml   Output              100 ml   Net             2000 ml       PATIENT LOCATION: PACU Phase 1  POST-OP VITAL SIGNS: stable  LEVEL OF CONSCIOUSNESS: sedated  RESPIRATORY STATUS: spontaneous ventilation, face mask and oral airway  CARDIOVASCULAR: stable  HYDRATION: euvolemic    AIRWAY PATENCY: patent  POST-OP ASSESSMENT: no complications  COMPLICATIONS: none  used

## 2024-03-16 NOTE — ED ADULT NURSE NOTE - HISTORY OF COVID-19 VACCINATION
Vaccine status unknown Abormal VS: Temp > 100F or < 96.8F; SBP < 90 mmHG; HR > 120bpm; Resp > 24/min

## 2024-03-16 NOTE — ED PROVIDER NOTE - PROGRESS NOTE DETAILS
discussed liver finding, f/u with pmd or GI    Reevaluated patient at bedside.  Patient feeling well. Discussed the results of all diagnostic testing in ED and copies of all available reports given.   An opportunity to ask questions was provided.  Discussed the importance of prompt, close medical follow-up.  Patient will return with any changes, concerns or persistent/worsening symptoms.  Understanding of all instructions verbalized.

## 2024-03-16 NOTE — ED PROVIDER NOTE - OBJECTIVE STATEMENT
59 M denies significant pmhx, nonsmoker referred from urgent care for CT scan due to abnormal cxr showing consolidation right lower lung. Pt reports for the past week he has been having fever Tmax ~103/104F. Has been taking advil/excedrin as needed, last dose ~4PM today. Reports fever has been lower 99-101F. Denies other complaints including congestion, sore throat, cp, sob, cough, n/v/d, abd pain, urinary complaints.

## 2024-03-16 NOTE — ED PROVIDER NOTE - ED STEMI HIDDEN
conducted a detailed discussion... I had a detailed discussion with the patient and/or guardian regarding the historical points, exam findings, and any diagnostic results supporting the discharge/admit diagnosis. hide

## 2024-03-16 NOTE — ED ADULT TRIAGE NOTE - CHIEF COMPLAINT QUOTE
I have had a high fever since tuesday, going up and beyond 104.  other than the fever, I really didn't feel too bad.  I did get tested for Flu and Covid and was negative.  I have no other symptoms.  no cough, no sob.  I went to urgent care and they did an xray and are concerned that I either have pneumonia or puss or "something" in my lung and they sent me here for a CT scan.  Denies any significant PMH.  Denies any h/o smoking.

## 2024-03-16 NOTE — ED PROVIDER NOTE - CARE PROVIDER_API CALL
Alfredo Paulson  Internal Medicine  [address] [place] [id number]  Phone: [phone]  Fax: [phone]  Follow Up Time:     Michael Dangelo  Gastroenterology  [address] [place] [id number]  Phone: [phone]  Fax: [phone]  Follow Up Time:

## 2024-03-16 NOTE — ED PROVIDER NOTE - CLINICAL SUMMARY MEDICAL DECISION MAKING FREE TEXT BOX
59-year-old male with no significant past medical history presents to the ED with complaints of intermittent fever x 1 week.  Patient states he went to the urgent care and had an x-ray which was positive for right lower lobe abnormality concerning for infiltrate i.e. pneumonia versus fluid.  Patient denies cough, body aches, chest pain, shortness of breath, recent travel, sick contacts.  Patient sent for further evaluation and treatment as well as a CT.  Labs, CT concerning for pneumonia versus mass versus effusion.

## 2024-03-17 LAB
GRAM STN FLD: ABNORMAL
METHOD TYPE: SIGNIFICANT CHANGE UP
MSSA DNA SPEC QL NAA+PROBE: SIGNIFICANT CHANGE UP
SPECIMEN SOURCE: SIGNIFICANT CHANGE UP
SPECIMEN SOURCE: SIGNIFICANT CHANGE UP

## 2024-03-18 ENCOUNTER — NON-APPOINTMENT (OUTPATIENT)
Age: 59
End: 2024-03-18

## 2024-03-18 ENCOUNTER — APPOINTMENT (OUTPATIENT)
Dept: INTERNAL MEDICINE | Facility: CLINIC | Age: 59
End: 2024-03-18
Payer: COMMERCIAL

## 2024-03-18 ENCOUNTER — INPATIENT (INPATIENT)
Facility: HOSPITAL | Age: 59
LOS: 6 days | Discharge: HOME | DRG: 872 | End: 2024-03-25
Attending: STUDENT IN AN ORGANIZED HEALTH CARE EDUCATION/TRAINING PROGRAM | Admitting: STUDENT IN AN ORGANIZED HEALTH CARE EDUCATION/TRAINING PROGRAM
Payer: COMMERCIAL

## 2024-03-18 VITALS
OXYGEN SATURATION: 95 % | TEMPERATURE: 99.5 F | HEART RATE: 83 BPM | SYSTOLIC BLOOD PRESSURE: 146 MMHG | RESPIRATION RATE: 16 BRPM | WEIGHT: 180 LBS | HEIGHT: 69 IN | DIASTOLIC BLOOD PRESSURE: 98 MMHG | BODY MASS INDEX: 26.66 KG/M2

## 2024-03-18 VITALS
WEIGHT: 179.9 LBS | HEART RATE: 87 BPM | DIASTOLIC BLOOD PRESSURE: 89 MMHG | TEMPERATURE: 99 F | OXYGEN SATURATION: 95 % | RESPIRATION RATE: 18 BRPM | SYSTOLIC BLOOD PRESSURE: 137 MMHG | HEIGHT: 70 IN

## 2024-03-18 DIAGNOSIS — R50.9 FEVER, UNSPECIFIED: ICD-10-CM

## 2024-03-18 PROBLEM — Z78.9 OTHER SPECIFIED HEALTH STATUS: Chronic | Status: ACTIVE | Noted: 2024-03-16

## 2024-03-18 LAB
ALBUMIN SERPL ELPH-MCNC: 3.8 G/DL — SIGNIFICANT CHANGE UP (ref 3.3–5)
ALP SERPL-CCNC: 62 U/L — SIGNIFICANT CHANGE UP (ref 40–120)
ALT FLD-CCNC: 45 U/L — SIGNIFICANT CHANGE UP (ref 12–78)
ANION GAP SERPL CALC-SCNC: 7 MMOL/L — SIGNIFICANT CHANGE UP (ref 5–17)
APPEARANCE UR: CLEAR — SIGNIFICANT CHANGE UP
APTT BLD: 29.5 SEC — SIGNIFICANT CHANGE UP (ref 24.5–35.6)
AST SERPL-CCNC: 34 U/L — SIGNIFICANT CHANGE UP (ref 15–37)
BASOPHILS # BLD AUTO: 0.04 K/UL — SIGNIFICANT CHANGE UP (ref 0–0.2)
BASOPHILS NFR BLD AUTO: 0.7 % — SIGNIFICANT CHANGE UP (ref 0–2)
BILIRUB SERPL-MCNC: 0.5 MG/DL — SIGNIFICANT CHANGE UP (ref 0.2–1.2)
BILIRUB UR-MCNC: NEGATIVE — SIGNIFICANT CHANGE UP
BUN SERPL-MCNC: 13 MG/DL — SIGNIFICANT CHANGE UP (ref 7–23)
CALCIUM SERPL-MCNC: 8.9 MG/DL — SIGNIFICANT CHANGE UP (ref 8.5–10.1)
CHLORIDE SERPL-SCNC: 105 MMOL/L — SIGNIFICANT CHANGE UP (ref 96–108)
CO2 SERPL-SCNC: 29 MMOL/L — SIGNIFICANT CHANGE UP (ref 22–31)
COLOR SPEC: YELLOW — SIGNIFICANT CHANGE UP
CREAT SERPL-MCNC: 0.82 MG/DL — SIGNIFICANT CHANGE UP (ref 0.5–1.3)
DIFF PNL FLD: NEGATIVE — SIGNIFICANT CHANGE UP
EGFR: 102 ML/MIN/1.73M2 — SIGNIFICANT CHANGE UP
EOSINOPHIL # BLD AUTO: 0.09 K/UL — SIGNIFICANT CHANGE UP (ref 0–0.5)
EOSINOPHIL NFR BLD AUTO: 1.5 % — SIGNIFICANT CHANGE UP (ref 0–6)
GLUCOSE SERPL-MCNC: 93 MG/DL — SIGNIFICANT CHANGE UP (ref 70–99)
GLUCOSE UR QL: NEGATIVE MG/DL — SIGNIFICANT CHANGE UP
HCT VFR BLD CALC: 45.8 % — SIGNIFICANT CHANGE UP (ref 39–50)
HGB BLD-MCNC: 14.7 G/DL — SIGNIFICANT CHANGE UP (ref 13–17)
IMM GRANULOCYTES NFR BLD AUTO: 0.2 % — SIGNIFICANT CHANGE UP (ref 0–0.9)
INR BLD: 1.02 RATIO — SIGNIFICANT CHANGE UP (ref 0.85–1.18)
KETONES UR-MCNC: ABNORMAL MG/DL
LACTATE SERPL-SCNC: 0.6 MMOL/L — LOW (ref 0.7–2)
LEUKOCYTE ESTERASE UR-ACNC: NEGATIVE — SIGNIFICANT CHANGE UP
LYMPHOCYTES # BLD AUTO: 1.84 K/UL — SIGNIFICANT CHANGE UP (ref 1–3.3)
LYMPHOCYTES # BLD AUTO: 30.2 % — SIGNIFICANT CHANGE UP (ref 13–44)
MCHC RBC-ENTMCNC: 27.7 PG — SIGNIFICANT CHANGE UP (ref 27–34)
MCHC RBC-ENTMCNC: 32.1 GM/DL — SIGNIFICANT CHANGE UP (ref 32–36)
MCV RBC AUTO: 86.3 FL — SIGNIFICANT CHANGE UP (ref 80–100)
MONOCYTES # BLD AUTO: 0.64 K/UL — SIGNIFICANT CHANGE UP (ref 0–0.9)
MONOCYTES NFR BLD AUTO: 10.5 % — SIGNIFICANT CHANGE UP (ref 2–14)
NEUTROPHILS # BLD AUTO: 3.48 K/UL — SIGNIFICANT CHANGE UP (ref 1.8–7.4)
NEUTROPHILS NFR BLD AUTO: 56.9 % — SIGNIFICANT CHANGE UP (ref 43–77)
NITRITE UR-MCNC: NEGATIVE — SIGNIFICANT CHANGE UP
NRBC # BLD: 0 /100 WBCS — SIGNIFICANT CHANGE UP (ref 0–0)
PH UR: 6 — SIGNIFICANT CHANGE UP (ref 5–8)
PLATELET # BLD AUTO: 243 K/UL — SIGNIFICANT CHANGE UP (ref 150–400)
POTASSIUM SERPL-MCNC: 4 MMOL/L — SIGNIFICANT CHANGE UP (ref 3.5–5.3)
POTASSIUM SERPL-SCNC: 4 MMOL/L — SIGNIFICANT CHANGE UP (ref 3.5–5.3)
PROT SERPL-MCNC: 8.1 G/DL — SIGNIFICANT CHANGE UP (ref 6–8.3)
PROT UR-MCNC: NEGATIVE MG/DL — SIGNIFICANT CHANGE UP
PROTHROM AB SERPL-ACNC: 11.9 SEC — SIGNIFICANT CHANGE UP (ref 9.5–13)
RBC # BLD: 5.31 M/UL — SIGNIFICANT CHANGE UP (ref 4.2–5.8)
RBC # FLD: 12.6 % — SIGNIFICANT CHANGE UP (ref 10.3–14.5)
SODIUM SERPL-SCNC: 141 MMOL/L — SIGNIFICANT CHANGE UP (ref 135–145)
SP GR SPEC: 1.02 — SIGNIFICANT CHANGE UP (ref 1–1.03)
UROBILINOGEN FLD QL: 1 MG/DL — SIGNIFICANT CHANGE UP (ref 0.2–1)
WBC # BLD: 6.1 K/UL — SIGNIFICANT CHANGE UP (ref 3.8–10.5)
WBC # FLD AUTO: 6.1 K/UL — SIGNIFICANT CHANGE UP (ref 3.8–10.5)

## 2024-03-18 PROCEDURE — 99214 OFFICE O/P EST MOD 30 MIN: CPT

## 2024-03-18 PROCEDURE — 99285 EMERGENCY DEPT VISIT HI MDM: CPT

## 2024-03-18 RX ORDER — CEFAZOLIN SODIUM 1 G
1000 VIAL (EA) INJECTION ONCE
Refills: 0 | Status: COMPLETED | OUTPATIENT
Start: 2024-03-18 | End: 2024-03-19

## 2024-03-18 RX ORDER — SODIUM CHLORIDE 9 MG/ML
1000 INJECTION INTRAMUSCULAR; INTRAVENOUS; SUBCUTANEOUS ONCE
Refills: 0 | Status: COMPLETED | OUTPATIENT
Start: 2024-03-18 | End: 2024-03-18

## 2024-03-18 RX ADMIN — SODIUM CHLORIDE 1000 MILLILITER(S): 9 INJECTION INTRAMUSCULAR; INTRAVENOUS; SUBCUTANEOUS at 23:13

## 2024-03-18 NOTE — HEALTH RISK ASSESSMENT
[Never (0 pts)] : Never (0 points) [No falls in past year] : Patient reported no falls in the past year [No] : In the past 12 months have you used drugs other than those required for medical reasons? No [0] : 2) Feeling down, depressed, or hopeless: Not at all (0) [PHQ-2 Negative - No further assessment needed] : PHQ-2 Negative - No further assessment needed [Never] : Never [[id number]] : 0

## 2024-03-18 NOTE — ED PROVIDER NOTE - NEUROLOGICAL, MLM
"  Cuauhtemoc Mauricio Patient Age: 82year old  MESSAGE:   Patient asking to speak to the nurse. States she is having problems with her stomach & she has ulcers & is having heartburn type symptoms. Call transferred to triage.       WEIGHT AND HEIGHT:   Wt Readings from Last 1 Encounters:   01/22/22 82.1 kg (181 lb)     Ht Readings from Last 1 Encounters:   12/06/21 5' 3"" (1.6 m)     BMI Readings from Last 1 Encounters:   01/22/22 32.06 kg/mÂ²       ALLERGIES:  Ace inhibitors, Nifedipine, Spironolactone, Yellow dye   (food or med), Minoxidil, Opioid analgesics, Aspirin, Fish oil, Hydrochlorothiazide w/triamterene, and Hydrocodone-acetaminophen  Current Outpatient Medications   Medication   â¢ triamcinolone (ARISTOCORT) 0.1 % cream   â¢ colesevelam (WelChol) 625 MG tablet   â¢ albuterol (VENTOLIN) (2.5 MG/3ML) 0.083% nebulizer solution   â¢ benzonatate (Tessalon Perles) 100 MG capsule   â¢ metoPROLOL succinate (TOPROL-XL) 50 MG 24 hr tablet   â¢ levothyroxine 50 MCG tablet   â¢ hydroCORTisone (Anusol-HC) 25 MG suppository   â¢ colestipol (COLESTID) 1 g Tab   â¢ Flovent  MCG/ACT inhaler   â¢ clotrimazole (LOTRIMIN) 1 % cream   â¢ nystatin-triamcinolone (MYCOLOG II) 534131-4.1 UNIT/GM-% cream   â¢ prednisoLONE acetate (PRED FORTE) 1 % ophthalmic suspension   â¢ albuterol 108 (90 Base) MCG/ACT inhaler   â¢ omeprazole (PrilOSEC) 20 MG capsule   â¢ eplerenone (INSPRA) 25 MG tablet   â¢ terconazole 80 MG vaginal suppository   â¢ fluocinonide (LIDEX) 0.05 % ointment   â¢ meloxicam (MOBIC) 15 MG tablet   â¢ Glycerin-Hypromellose- 0.2-0.2-1 % Solution   â¢ Respiratory Therapy Supplies (NEBULIZER COMPRESSOR) Kit   â¢ fluorouracil (EFUDEX) 5 % cream   â¢ Spacer/Aero-Holding Chambers (AEROCHAMBER) Misc   â¢ olopatadine (PATANOL) 0.1 % ophthalmic solution   â¢ fluocinolone (SYNALAR) 0.01 % topical solution   â¢ metroNIDAZOLE (METROCREAM) 0.75 % cream   â¢ hydroCORTisone (CORTIZONE) 2.5 % ointment     No current facility-administered medications for " this visit.      [facility]: [facility]          Pharmacy preference(s) on file:   [address] [facility] [address] [facility] AT [address] [facility]  [address] [facility]  [address] [place] [id number]  Phone: [phone] Fax: [phone]: Smita Pearson to leave response (including medical information) with family member or on answering machine  ROUTING: Patient's physician/staff        PCP: Alejandrina Garcia MD         INS: Payor: Marisabel Lane / Plan: Ellis Ndiaye / Product Type: MEDICARE   PATIENT ADDRESS:  [address] [place] [id number] Alert and oriented, no focal deficits, no motor or sensory deficits.

## 2024-03-18 NOTE — PHYSICAL EXAM
[No Acute Distress] : no acute distress [Well Nourished] : well nourished [Well Developed] : well developed [Normal Sclera/Conjunctiva] : normal sclera/conjunctiva [Normal Voice/Communication] : normal voice/communication [Well-Appearing] : well-appearing [EOMI] : extraocular movements intact [PERRL] : pupils equal round and reactive to light [Normal Oropharynx] : the oropharynx was normal [Normal Outer Ear/Nose] : the outer ears and nose were normal in appearance [Normal TMs] : both tympanic membranes were normal [No JVD] : no jugular venous distention [No Lymphadenopathy] : no lymphadenopathy [Supple] : supple [Thyroid Normal, No Nodules] : the thyroid was normal and there were no nodules present [No Respiratory Distress] : no respiratory distress  [Clear to Auscultation] : lungs were clear to auscultation bilaterally [No Accessory Muscle Use] : no accessory muscle use [Normal Rate] : normal rate  [Regular Rhythm] : with a regular rhythm [No Murmur] : no murmur heard [Normal S1, S2] : normal S1 and S2 [No Abdominal Bruit] : a ~M bruit was not heard ~T in the abdomen [No Carotid Bruits] : no carotid bruits [No Varicosities] : no varicosities [Pedal Pulses Present] : the pedal pulses are present [No Palpable Aorta] : no palpable aorta [No Edema] : there was no peripheral edema [No Extremity Clubbing/Cyanosis] : no extremity clubbing/cyanosis [Soft] : abdomen soft [Non Tender] : non-tender [No Masses] : no abdominal mass palpated [Non-distended] : non-distended [No HSM] : no HSM [Normal Bowel Sounds] : normal bowel sounds [Normal Supraclavicular Nodes] : no supraclavicular lymphadenopathy [Normal Posterior Cervical Nodes] : no posterior cervical lymphadenopathy [Normal Anterior Cervical Nodes] : no anterior cervical lymphadenopathy [No CVA Tenderness] : no CVA  tenderness [No Spinal Tenderness] : no spinal tenderness [Grossly Normal Strength/Tone] : grossly normal strength/tone [No Joint Swelling] : no joint swelling [Coordination Grossly Intact] : coordination grossly intact [No Rash] : no rash [No Focal Deficits] : no focal deficits [Normal Gait] : normal gait [Deep Tendon Reflexes (DTR)] : deep tendon reflexes were 2+ and symmetric [Speech Grossly Normal] : speech grossly normal [Memory Grossly Normal] : memory grossly normal [Normal Affect] : the affect was normal [Alert and Oriented x3] : oriented to person, place, and time [Normal Mood] : the mood was normal [Normal Insight/Judgement] : insight and judgment were intact

## 2024-03-18 NOTE — PLAN
[FreeTextEntry1] : Sent to ER [facility]   Blood Culture results: MSSA positive.  Chronic Medical Conditions: Hypercholesteremia.  continue medications including Atorvastatin.  needs IV antibiotics ID consult

## 2024-03-18 NOTE — ED PROVIDER NOTE - CLINICAL SUMMARY MEDICAL DECISION MAKING FREE TEXT BOX
59-year-old male with fevers, positive blood cultures, will send CBC, CMP, lactate level, blood cultures, urine analysis, urine culture, patient had CT of the chest done on 3/16/2024 with no acute findings, today patient will get CT of the abdomen pelvis to look for any source of infection, will start cefazolin antibiotic and admit.

## 2024-03-18 NOTE — ED PROVIDER NOTE - OBJECTIVE STATEMENT
59-year-old male with no significant past medical history was called to return to the emergency department for positive blood cultures, MSSA.  Patient states that for the past week he has been having fevers Tmax of 104 °F, denies cough, no abdominal pain, no chest pain, no vomiting, no urinary symptoms.  Patient had coming to the emergency department on 3/16/2024 and had CBC, CMP, blood cultures, CT of the chest done, no acute findings, patient went to go see his primary care doctor today, and culture results were reviewed and noted to be positive for MSSA and told to go back to the emergency department to be admitted.

## 2024-03-18 NOTE — HISTORY OF PRESENT ILLNESS
[FreeTextEntry1] : follow up  [de-identified] : URSULA CUNHA is a 59-year-old M who presents today for follow up after ER visit for fever. Pt reports that the fever first started 5 days ago and has since tested negative for COVID and Flu. Pt went to the Urgent Care and was sent to the ER 2 days ago. Pt's WBC was normal, liver enzymes were normal, and also had CT scan of chest which was normal. Pt denies any recent tic bites and also denies SOB. Pt was given  two antibiotics. Once  Blood Culture: MSSA positive.  Pt denies any recent dental work, cuts or wounds, and denies any recent travel and/or abdominal pain.  Pt has hx of Hypercholesteremia.

## 2024-03-18 NOTE — ED ADULT TRIAGE NOTE - HISTORY OF COVID-19 VACCINATION
Subjective:     CC: Diagnoses of Seasonal allergies, Elevated fasting glucose, and Flexural eczema were pertinent to this visit.    HPI: Patient is a 59 y.o. female established patient who presents today to follow up on labs and allergies.   Overall labs look great. She did have a new slightly elevated blood glucose.       Seasonal allergies  Chronic problem, patient did note significant improvement with montelukast.  She has never been requiring any allergy meds for the past couple weeks.  Using Flonase as well.    Elevated fasting glucose  New problem. Mildly elevated fasting blood sugar at 102.     Flexural eczema  New problem. The patient has a long history of allergies and eczema. Usually only in the winter but curerntly having a flare up. Requesting Rx for steroid cream.       Past Medical History:   Diagnosis Date   • Anesthesia     PONV   • Cold 12/24/16   • Eczema    • Gynecological disorder    • Infectious disease    • No known health problems    • Pneumonia 1996       Social History     Tobacco Use   • Smoking status: Never Smoker   • Smokeless tobacco: Never Used   Vaping Use   • Vaping Use: Never used   Substance Use Topics   • Alcohol use: Yes     Comment: 5 drinks per week    • Drug use: No       Current Outpatient Medications Ordered in Epic   Medication Sig Dispense Refill   • triamcinolone acetonide (KENALOG) 0.1 % Cream Apply thin layer to affected areas BID for up to 2 weeks prn eczema 45 g 2   • fluticasone (FLONASE) 50 MCG/ACT nasal spray Administer 1 Spray into affected nostril(S) every day. 16 g 2   • montelukast (SINGULAIR) 10 MG Tab Take 1 Tablet by mouth every day. 30 Tablet 2   • scopolamine (TRANSDERM-SCOP, 1.5 MG,) 1 mg/72hr PATCH 72 HR Place 1 Patch on the skin every 72 hours. 4 Patch 3   • estradiol (ESTRACE) 2 MG Tab Take 2 mg by mouth every day.       No current [facility]-ordered facility-administered medications on file.       Allergies:  Demerol    Health Maintenance:  "Completed      Objective:       Exam:  /60 (BP Location: Right arm, Patient Position: Sitting, BP Cuff Size: Adult long)   Pulse 61   Temp 37.1 °C (98.8 °F) (Temporal)   Ht 1.854 m (6' 1\")   Wt 86.6 kg (191 lb)   LMP 01/02/2017 (Exact Date)   SpO2 99%   BMI 25.20 kg/m²  Body mass index is 25.2 kg/m².    General: Normal appearing. No distress.  HEAD: NCAT  EYES: conjunctiva clear, lids without ptosis, pupils equal and reactive to light  EARS: ears normal shape and contour.   Neck:  Normal ROM  Pulmonary: Normal effort. Normal respiratory rate.  Cardiovascular: Well perfused. No LE edema  Neurologic: Grossly normal, no focal deficits  Skin: Warm and dry.  No obvious lesions.  Musculoskeletal: Normal gait and station.   Psych: Normal mood and affect. Alert and oriented x3. Judgment and insight is normal.    Labs: 7/20/22 Results reviewed and discussed with the patient, questions answered.    Assessment & Plan:     59 y.o. female with the following -     1. Seasonal allergies  Chronic problem. Improvement noted with Singulair, however, not requiring any medications right now.    2. Elevated fasting glucose  New problem.  Very slightly elevated.  Overall patient is extremely healthy.  Lipid panel looks great.  Plan to hold off on an A1c.  Plan to repeat labs next year.    3. Flexural eczema  New to discuss, long history of eczema.  Prescription given for triamcinolone cream.  - triamcinolone acetonide (KENALOG) 0.1 % Cream; Apply thin layer to affected areas BID for up to 2 weeks prn eczema  Dispense: 45 g; Refill: 2    Return in about 1 year (around 7/25/2023), or if symptoms worsen or fail to improve.    Please note that this dictation was created using voice recognition software. I have made every reasonable attempt to correct obvious errors, but I expect that there are errors of grammar and possibly content that I did not discover before finalizing the note.      " No

## 2024-03-18 NOTE — ED ADULT TRIAGE NOTE - CHIEF COMPLAINT QUOTE
Pt states that he received a call from the ER for a positive blood culture result. pt is c/o fever and chills.

## 2024-03-18 NOTE — END OF VISIT
[FreeTextEntry3] : "I, Jose R Christina, personally scribed the services dictated to me by Dr. Alfredo Paulsno MD in this documentation on 03/18/2024 "   "I Dr. Alfredo Paulson MD, personally performed the services described in this documentation on 03/18/2024 for the patient as scribed by Jose R Christina in my presence. I have reviewed and verified that all the information is accurate and true."

## 2024-03-19 ENCOUNTER — RESULT REVIEW (OUTPATIENT)
Age: 59
End: 2024-03-19

## 2024-03-19 DIAGNOSIS — R78.81 BACTEREMIA: ICD-10-CM

## 2024-03-19 LAB
-  AMPICILLIN/SULBACTAM: SIGNIFICANT CHANGE UP
-  CEFAZOLIN: SIGNIFICANT CHANGE UP
-  CLINDAMYCIN: SIGNIFICANT CHANGE UP
-  ERYTHROMYCIN: SIGNIFICANT CHANGE UP
-  GENTAMICIN: SIGNIFICANT CHANGE UP
-  OXACILLIN: SIGNIFICANT CHANGE UP
-  PENICILLIN: SIGNIFICANT CHANGE UP
-  RIFAMPIN: SIGNIFICANT CHANGE UP
-  TETRACYCLINE: SIGNIFICANT CHANGE UP
-  TRIMETHOPRIM/SULFAMETHOXAZOLE: SIGNIFICANT CHANGE UP
-  VANCOMYCIN: SIGNIFICANT CHANGE UP
ALBUMIN SERPL ELPH-MCNC: 3.1 G/DL — LOW (ref 3.3–5)
ALP SERPL-CCNC: 50 U/L — SIGNIFICANT CHANGE UP (ref 40–120)
ALT FLD-CCNC: 34 U/L — SIGNIFICANT CHANGE UP (ref 12–78)
ANION GAP SERPL CALC-SCNC: 9 MMOL/L — SIGNIFICANT CHANGE UP (ref 5–17)
AST SERPL-CCNC: 21 U/L — SIGNIFICANT CHANGE UP (ref 15–37)
BILIRUB SERPL-MCNC: 0.5 MG/DL — SIGNIFICANT CHANGE UP (ref 0.2–1.2)
BUN SERPL-MCNC: 10 MG/DL — SIGNIFICANT CHANGE UP (ref 7–23)
CALCIUM SERPL-MCNC: 8.4 MG/DL — LOW (ref 8.5–10.1)
CHLORIDE SERPL-SCNC: 107 MMOL/L — SIGNIFICANT CHANGE UP (ref 96–108)
CO2 SERPL-SCNC: 26 MMOL/L — SIGNIFICANT CHANGE UP (ref 22–31)
CREAT SERPL-MCNC: 0.71 MG/DL — SIGNIFICANT CHANGE UP (ref 0.5–1.3)
CULTURE RESULTS: ABNORMAL
CULTURE RESULTS: ABNORMAL
EGFR: 107 ML/MIN/1.73M2 — SIGNIFICANT CHANGE UP
GLUCOSE SERPL-MCNC: 112 MG/DL — HIGH (ref 70–99)
HCOV PNL SPEC NAA+PROBE: DETECTED
HCT VFR BLD CALC: 39.1 % — SIGNIFICANT CHANGE UP (ref 39–50)
HGB BLD-MCNC: 13.1 G/DL — SIGNIFICANT CHANGE UP (ref 13–17)
MCHC RBC-ENTMCNC: 28.4 PG — SIGNIFICANT CHANGE UP (ref 27–34)
MCHC RBC-ENTMCNC: 33.5 GM/DL — SIGNIFICANT CHANGE UP (ref 32–36)
MCV RBC AUTO: 84.6 FL — SIGNIFICANT CHANGE UP (ref 80–100)
METHOD TYPE: SIGNIFICANT CHANGE UP
NRBC # BLD: 0 /100 WBCS — SIGNIFICANT CHANGE UP (ref 0–0)
ORGANISM # SPEC MICROSCOPIC CNT: ABNORMAL
ORGANISM # SPEC MICROSCOPIC CNT: ABNORMAL
ORGANISM # SPEC MICROSCOPIC CNT: SIGNIFICANT CHANGE UP
PLATELET # BLD AUTO: 210 K/UL — SIGNIFICANT CHANGE UP (ref 150–400)
POTASSIUM SERPL-MCNC: 3.5 MMOL/L — SIGNIFICANT CHANGE UP (ref 3.5–5.3)
POTASSIUM SERPL-SCNC: 3.5 MMOL/L — SIGNIFICANT CHANGE UP (ref 3.5–5.3)
PROT SERPL-MCNC: 6.6 G/DL — SIGNIFICANT CHANGE UP (ref 6–8.3)
RAPID RVP RESULT: DETECTED
RBC # BLD: 4.62 M/UL — SIGNIFICANT CHANGE UP (ref 4.2–5.8)
RBC # FLD: 12.4 % — SIGNIFICANT CHANGE UP (ref 10.3–14.5)
SARS-COV-2 RNA SPEC QL NAA+PROBE: SIGNIFICANT CHANGE UP
SODIUM SERPL-SCNC: 142 MMOL/L — SIGNIFICANT CHANGE UP (ref 135–145)
SPECIMEN SOURCE: SIGNIFICANT CHANGE UP
SPECIMEN SOURCE: SIGNIFICANT CHANGE UP
WBC # BLD: 5.77 K/UL — SIGNIFICANT CHANGE UP (ref 3.8–10.5)
WBC # FLD AUTO: 5.77 K/UL — SIGNIFICANT CHANGE UP (ref 3.8–10.5)

## 2024-03-19 PROCEDURE — 74177 CT ABD & PELVIS W/CONTRAST: CPT | Mod: 26,MC

## 2024-03-19 PROCEDURE — 93010 ELECTROCARDIOGRAM REPORT: CPT

## 2024-03-19 PROCEDURE — 99222 1ST HOSP IP/OBS MODERATE 55: CPT

## 2024-03-19 PROCEDURE — 93306 TTE W/DOPPLER COMPLETE: CPT | Mod: 26

## 2024-03-19 RX ORDER — CEFAZOLIN SODIUM 1 G
2000 VIAL (EA) INJECTION EVERY 8 HOURS
Refills: 0 | Status: DISCONTINUED | OUTPATIENT
Start: 2024-03-19 | End: 2024-03-25

## 2024-03-19 RX ORDER — ACETAMINOPHEN 500 MG
650 TABLET ORAL EVERY 6 HOURS
Refills: 0 | Status: DISCONTINUED | OUTPATIENT
Start: 2024-03-19 | End: 2024-03-25

## 2024-03-19 RX ORDER — CEFAZOLIN SODIUM 1 G
2000 VIAL (EA) INJECTION EVERY 8 HOURS
Refills: 0 | Status: DISCONTINUED | OUTPATIENT
Start: 2024-03-19 | End: 2024-03-19

## 2024-03-19 RX ORDER — ONDANSETRON 8 MG/1
4 TABLET, FILM COATED ORAL EVERY 8 HOURS
Refills: 0 | Status: DISCONTINUED | OUTPATIENT
Start: 2024-03-19 | End: 2024-03-25

## 2024-03-19 RX ORDER — LANOLIN ALCOHOL/MO/W.PET/CERES
3 CREAM (GRAM) TOPICAL AT BEDTIME
Refills: 0 | Status: DISCONTINUED | OUTPATIENT
Start: 2024-03-19 | End: 2024-03-25

## 2024-03-19 RX ADMIN — Medication 100 MILLIGRAM(S): at 21:17

## 2024-03-19 RX ADMIN — Medication 100 MILLIGRAM(S): at 00:37

## 2024-03-19 RX ADMIN — Medication 100 MILLIGRAM(S): at 13:08

## 2024-03-19 RX ADMIN — Medication 650 MILLIGRAM(S): at 11:36

## 2024-03-19 RX ADMIN — Medication 650 MILLIGRAM(S): at 11:55

## 2024-03-19 RX ADMIN — Medication 3 MILLIGRAM(S): at 21:18

## 2024-03-19 RX ADMIN — Medication 100 MILLIGRAM(S): at 05:28

## 2024-03-19 NOTE — ED ADULT NURSE NOTE - OBJECTIVE STATEMENT
Pt had fevers for a week long, called about positive blood cultures and came back to the ED. Pt asymptomatic, denies any chest pain, SOB, no n/v/d. Pt resting comfortably in bed, rails up and wheels locked in place. Labs sent and meds given as per MD orders. R20 placed, dry and intact, flushes without difficulty.

## 2024-03-19 NOTE — CARE COORDINATION ASSESSMENT. - NSCAREPROVIDERS_GEN_ALL_CORE_FT
CARE PROVIDERS:  Accepting Physician: Sandra Phillips  Administration: Harvey Jessica  Administration: Tiki Queen  Admitting: Sandra Phillips  Attending: Sandra Phillips  Cardiology Technician: Jacinda Humphries  Case Management: Bell Valdez  Covering Team: Rashawn Brennan  ED Attending: Hawk Willams  ED Nurse: Patricia Che  Nurse: Kevin Jraquin  Nurse: Shawna Sweet  Nurse: Dolores Nichols  Nurse: Noa Ferguson  Ordered: ADM, User  PCA/Nursing Assistant: Ashli Childers  PCA/Nursing Assistant: Ese Martinez  Primary Team: Chetan Cortes  Primary Team: Shannan Aleman  Primary Team: Sandra Phillips  Registered Dietitian: Hallie Howell  Student: Aure Guallpa  Team: [facility], Team  UR// Supp. Assoc.: Gaby Boyd

## 2024-03-19 NOTE — ED ADULT NURSE NOTE - SUICIDE SCREENING QUESTION 1
Other Procedure: benign destruction on the neck Size Of Lesion In Cm (Optional): 0 Introduction Text (Please End With A Colon): The following procedure was deferred: Detail Level: Detailed No

## 2024-03-19 NOTE — ED ADULT NURSE NOTE - NSFALLHARMRISKINTERV_ED_ALL_ED

## 2024-03-19 NOTE — PROGRESS NOTE ADULT - ASSESSMENT
58 yo M no PMHx who presents for abnormal labs. Admitted for treatment of MSSA bacteremia.    #MSSA bacteremia  c/w cefazolin 2 g q8h  TTE order to evaluate for vegetations  ID consult placed with Dr Leger- berenice castro need JIMMY as no source identified- Cardio- Dr. Guerrero consulted, appreciate recs  Tylenol for fever  Follow up repeat cultures  CT abd/pelvis- no acute abdominal pathology, noted 1.8cm hepatic cyst and subcentimeter hepatic lesion- patient states he was made aware on prior ED visit, will follow up wit GI as outpatient    #Coronavirus  -NOT COVID-19  -Supportive care    #DVT ppx: SCDs

## 2024-03-19 NOTE — CARE COORDINATION ASSESSMENT. - NSDCPLANSERVICES_GEN_ALL_CORE
If patient is being discharged on po antibiotics -anticipate no skilled needs. If patient requires IV infusion, CM will arrange home infusion

## 2024-03-19 NOTE — H&P ADULT - ASSESSMENT
58 yo M no PMHx who presents for abnormal labs. Admitted for treatment of MSSA bacteremia.    #MSSA bacteremia  c/w cefazolin 2 g q8h  TTE order to evaluate for vegetations  ID consult placed  Follow up repeat cultures    DVT ppx: SCDs  Diet; regular 58 yo M no PMHx who presents for abnormal labs. Admitted for treatment of MSSA bacteremia.    #MSSA bacteremia  c/w cefazolin 2 g q8h  TTE order to evaluate for vegetations  ID consult placed with Dr Arsen Church for fever  Follow up repeat cultures    DVT ppx: SCDs  Diet; regular

## 2024-03-19 NOTE — H&P ADULT - HISTORY OF PRESENT ILLNESS
60 yo M no PMHx who presents for abnormal labs. Pt had presented to the ED on 3/16 with complaints of fever and abnormal CXR showing consolidation in the R lung. Was discharged from the ED and blood cultures grew MSSA. Attempts to reach to pt were unsuccesful but pt was at PCP today where the MSSA bacteremia was noted and pt was sent to the ED. Symptomatically pt states he is improving.  60 yo pleasant M no PMHx who presents for abnormal labs. Pt had presented to the ED on 3/16 with complaints of fever and abnormal CXR showing consolidation in the R lung. Was discharged from the ED and blood cultures grew MSSA. Attempts to reach to pt were unsuccesful but pt was at PCP today where the MSSA bacteremia was noted and pt was sent to the ED. Symptomatically pt states he is improving.  58 yo pleasant M no PMHx who presents for abnormal labs. Pt had presented to the ED on 3/16 with complaints of fever and abnormal CXR showing consolidation in the R lung. Was discharged from the ED and blood cultures grew MSSA. Attempts to reach to pt were unsuccesful but pt was at PCP today where the MSSA bacteremia was noted and pt was sent to the ED. Symptomatically pt states he is improving, fevers have improved. No arthralgias or myalgias, no palpitations, no N/V, dysuria, no abdominal pain.

## 2024-03-19 NOTE — H&P ADULT - NSHPPHYSICALEXAM_GEN_ALL_CORE
T(C): 37.4 (03-18-24 @ 19:15), Max: 37.4 (03-18-24 @ 19:15)  HR: 87 (03-18-24 @ 19:15) (87 - 87)  BP: 137/89 (03-18-24 @ 19:15) (137/89 - 137/89)  RR: 18 (03-18-24 @ 19:15) (18 - 18)  SpO2: 95% (03-18-24 @ 19:15) (95% - 95%)    GENERAL: patient appears well, no acute distress, appropriate, pleasant  EYES: sclera clear, no exudates  ENMT: oropharynx clear without erythema, no exudates, moist mucous membranes  NECK: supple, soft, no thyromegaly noted  LUNGS: good air entry bilaterally, clear to auscultation, symmetric breath sounds, no wheezing or rhonchi appreciated  HEART: soft S1/S2, regular rate and rhythm, no murmurs noted, no lower extremity edema  GASTROINTESTINAL: abdomen is soft, nontender, nondistended, normoactive bowel sounds, no palpable masses  INTEGUMENT: good skin turgor, warm skin, appears well perfused  MUSCULOSKELETAL: no clubbing or cyanosis, no obvious deformity  NEUROLOGIC: awake, alert, oriented x3, good muscle tone in 4 extremities, no obvious sensory deficits  PSYCHIATRIC: mood is good, affect is congruent, linear and logical thought process  HEME/LYMPH: no palpable supraclavicular nodules, no obvious ecchymosis or petechiae

## 2024-03-19 NOTE — H&P ADULT - SOCIAL HISTORY
Monitor for worsening symptoms  Push fluids  Rest  C/w medications as prescribed  F/u as needed     No

## 2024-03-19 NOTE — PATIENT PROFILE ADULT - FALL HARM RISK - UNIVERSAL INTERVENTIONS
Bed in lowest position, wheels locked, appropriate side rails in place/Call bell, personal items and telephone in reach/Instruct patient to call for assistance before getting out of bed or chair/Non-slip footwear when patient is out of bed/[place] to call system/Physically safe environment - no spills, clutter or unnecessary equipment/Purposeful Proactive Rounding/Room/bathroom lighting operational, light cord in reach

## 2024-03-19 NOTE — CARE COORDINATION ASSESSMENT. - OTHER PERTINENT DISCHARGE PLANNING INFORMATION:
CM met with the patient at the bedside and explained role of CM and transition planning. Patient verbalized understanding. Patient lives in a private home with his spouse and daughter in a private home. Independent with ADL's/ambulation/driving PTA. No DME or previous services PTA. CM provided direct contact/resource folder and remains available.

## 2024-03-19 NOTE — CONSULT NOTE ADULT - SUBJECTIVE AND OBJECTIVE BOX
[facility]  INFECTIOUS DISEASES   [address] [place] [address]  Tel: [phone]     Fax: [phone]  ========================================================  MD Jaquelin Flores Kaushal, MD Cho, Michelle, MD Sunjit, Jaspal, MD  ========================================================    MRN-[id number]  URSULA CUNHA     CC: Patient is a 59y old  Male who presents with a chief complaint of MSSA bacteremia (19 Mar 2024 01:29)    HPI:  58 yo man with history of bilateral hip replacement was admitted with positive blood cultures. He had fever for few days, was seen in an urgent care with possible R lung consolidation for which azithromycin given.   He was seen in ED on 3/16 and had labs and Blood cultures that came back positive in both sets.   Currently he is afebrile with no respiratory, GI,  or any other complaint. No skin lesion or rash.     PAST MEDICAL & SURGICAL HISTORY:  No pertinent past medical history    Social Hx: No current smoking, EtOH or drugs     FAMILY HISTORY:  Noncontributory     Allergies  No Known Allergies    MEDICATIONS  (STANDING):  ceFAZolin   IVPB 2000 milliGRAM(s) IV Intermittent every 8 hours    MEDICATIONS  (PRN):  acetaminophen     Tablet .. 650 milliGRAM(s) Oral every 6 hours PRN Temp greater or equal to 38C (100.4F), Mild Pain (1 - 3)  aluminum hydroxide/magnesium hydroxide/simethicone Suspension 30 milliLiter(s) Oral every 4 hours PRN Dyspepsia  melatonin 3 milliGRAM(s) Oral at bedtime PRN Insomnia  ondansetron Injectable 4 milliGRAM(s) IV Push every 8 hours PRN Nausea and/or Vomiting    REVIEW OF SYSTEMS:  CONSTITUTIONAL:  No Fever or chills  HEENT:  No diplopia or blurred vision.  No sore throat or runny nose.  CARDIOVASCULAR:  No chest pain   RESPIRATORY:  No cough, shortness of breath, PND or orthopnea.  GASTROINTESTINAL:  No nausea, vomiting or diarrhea.  GENITOURINARY:  No dysuria, frequency or urgency. No Blood in urine  MUSCULOSKELETAL:  no joint aches, no muscle pain  SKIN:  No change in skin, hair or nails.    Physical Exam:  Vital Signs Last 24 Hrs  T(C): 37.1 (19 Mar 2024 07:08), Max: 37.4 (18 Mar 2024 19:15)  T(F): 98.7 (19 Mar 2024 07:08), Max: 99.3 (18 Mar 2024 19:15)  HR: 71 (19 Mar 2024 07:08) (70 - 87)  BP: 160/87 (19 Mar 2024 07:08) (137/89 - 160/87)  RR: 18 (19 Mar 2024 07:08) (18 - 18)  SpO2: 92% (19 Mar 2024 07:08) (92% - 95%)  Parameters below as of 19 Mar 2024 07:08  Patient On (Oxygen Delivery Method): room air  Height (cm): 177.8 (03-18 @ 19:15)  Weight (kg): 81.6 (03-18 @ 19:15)  BMI (kg/m2): 25.8 (03-18 @ 19:15)  BSA (m2): 2 (03-18 @ 19:15)  GEN: NAD  HEENT: normocephalic and atraumatic. EOMI. PERRL.    NECK: Supple.  No lymphadenopathy   LUNGS: Clear to auscultation.  HEART: Regular rate and rhythm without murmur.  ABDOMEN: Soft, nontender, and nondistended.  Positive bowel sounds.    : No CVA tenderness  EXTREMITIES: Without edema.  NEUROLOGIC: grossly intact.  PSYCHIATRIC: Appropriate affect .  SKIN: No rash     Labs:  03-19    142  |  107  |  10  ----------------------------<  112<H>  3.5   |  26  |  0.71    Ca    8.4<L>      19 Mar 2024 04:36    TPro  6.6  /  Alb  3.1<L>  /  TBili  0.5  /  DBili  x   /  AST  21  /  ALT  34  /  AlkPhos  50  03-19                        13.1   5.77  )-----------( 210      ( 19 Mar 2024 04:36 )             39.1     PT/INR - ( 18 Mar 2024 23:10 )   PT: 11.9 sec;   INR: 1.02 ratio    PTT - ( 18 Mar 2024 23:10 )  PTT:29.5 sec  Urinalysis Basic - ( 19 Mar 2024 04:36 )    Color: x / Appearance: x / SG: x / pH: x  Gluc: 112 mg/dL / Ketone: x  / Bili: x / Urobili: x   Blood: x / Protein: x / Nitrite: x   Leuk Esterase: x / RBC: x / WBC x   Sq Epi: x / Non Sq Epi: x / Bacteria: x    LIVER FUNCTIONS - ( 19 Mar 2024 04:36 )  Alb: 3.1 g/dL / Pro: 6.6 g/dL / ALK PHOS: 50 U/L / ALT: 34 U/L / AST: 21 U/L / GGT: x           SARS-CoV-2: NotDetec (03-19-24 @ 03:58)  SARS-CoV-2: NotDetec (03-16-24 @ 20:17)    RECENT CULTURES:  03-19 @ 03:58      Detected    03-16 @ 20:20 .Blood Blood-Peripheral     Growth in aerobic and anaerobic bottles: Staphylococcus aureus  See previous culture [id number]    Growth in anaerobic bottle: Gram Positive Cocci in Clusters  Growth in aerobic bottle: Gram Positive Cocci in Clusters    03-16 @ 20:17      NotDetec    03-16 @ 20:05 .Blood Blood-Peripheral Blood Culture PCR  Staphylococcus aureus    Growth in aerobic and anaerobic bottles: Staphylococcus aureus  Direct identification is available within approximately 3-5  hours either by Blood Panel Multiplexed PCR or Direct  MALDI-TOF. Details: [email or web address]    Growth in aerobic bottle: Gram Positive Cocci in Clusters  Growth in anaerobic bottle: Gram Positive Cocci in Clusters    All imaging and other data have been reviewed.  < from: CT Abdomen and Pelvis w/ IV Cont (03.19.24 @ 00:45) >  IMPRESSION:  No acute abdominopelvic abnormality.    < from: CT Chest w/ IV Cont (03.16.24 @ 21:52) >  IMPRESSION:  No acute findings detected.    Assessment and Plan:   58 yo man with history of bilateral hip replacement was admitted with positive blood cultures. He had fever for few days, was seen in an urgent care with possible R lung consolidation for which azithromycin given.   He was seen in ED on 3/16 and had labs and Blood cultures that came back positive in both sets.   Currently he is afebrile with no respiratory, GI,  or any other complaint. No skin lesion or rash. No sick contact, no contact sports.     Source of staph bacteremia unclear at this time, but it is a true high grade bacteremia 4/4 bottles positive. He could have had mild pneumonia? or MSSA colonization in nares causing bacteremia even though not common.   At this time no back or hip pain.     # MSSA Bacteremia   # URI with coronavirus     - Blood cultures with MSSA on 3/16  - Repeat blood cultures pending   - WBC and Tmax normal will monitor  - Cefazolin 2gm q8  - TTE  - Cardiology consult for possible JIMMY     Thank you for courtesy of this consult.     Will follow.  Discussed with the primary team.     Jean Pierre Leger MD  Division of Infectious Diseases   Please call ID service at [phone] with any question.    75 minutes spent on total encounter assessing patient, examination, chart review, counseling and coordinating care by the attending physician/nurse/care manager.

## 2024-03-20 LAB
ALBUMIN SERPL ELPH-MCNC: 3.2 G/DL — LOW (ref 3.3–5)
ALP SERPL-CCNC: 53 U/L — SIGNIFICANT CHANGE UP (ref 40–120)
ALT FLD-CCNC: 30 U/L — SIGNIFICANT CHANGE UP (ref 12–78)
ANION GAP SERPL CALC-SCNC: 7 MMOL/L — SIGNIFICANT CHANGE UP (ref 5–17)
AST SERPL-CCNC: 18 U/L — SIGNIFICANT CHANGE UP (ref 15–37)
BILIRUB SERPL-MCNC: 0.5 MG/DL — SIGNIFICANT CHANGE UP (ref 0.2–1.2)
BUN SERPL-MCNC: 9 MG/DL — SIGNIFICANT CHANGE UP (ref 7–23)
CALCIUM SERPL-MCNC: 8.8 MG/DL — SIGNIFICANT CHANGE UP (ref 8.5–10.1)
CHLORIDE SERPL-SCNC: 106 MMOL/L — SIGNIFICANT CHANGE UP (ref 96–108)
CO2 SERPL-SCNC: 30 MMOL/L — SIGNIFICANT CHANGE UP (ref 22–31)
CREAT SERPL-MCNC: 0.75 MG/DL — SIGNIFICANT CHANGE UP (ref 0.5–1.3)
CULTURE RESULTS: ABNORMAL
CULTURE RESULTS: ABNORMAL
CULTURE RESULTS: NO GROWTH — SIGNIFICANT CHANGE UP
EGFR: 105 ML/MIN/1.73M2 — SIGNIFICANT CHANGE UP
ERYTHROCYTE [SEDIMENTATION RATE] IN BLOOD: 26 MM/HR — HIGH (ref 0–20)
GLUCOSE SERPL-MCNC: 111 MG/DL — HIGH (ref 70–99)
GRAM STN FLD: ABNORMAL
GRAM STN FLD: ABNORMAL
HCT VFR BLD CALC: 43.3 % — SIGNIFICANT CHANGE UP (ref 39–50)
HGB BLD-MCNC: 14.4 G/DL — SIGNIFICANT CHANGE UP (ref 13–17)
MCHC RBC-ENTMCNC: 28.5 PG — SIGNIFICANT CHANGE UP (ref 27–34)
MCHC RBC-ENTMCNC: 33.3 GM/DL — SIGNIFICANT CHANGE UP (ref 32–36)
MCV RBC AUTO: 85.6 FL — SIGNIFICANT CHANGE UP (ref 80–100)
MRSA PCR RESULT.: SIGNIFICANT CHANGE UP
NRBC # BLD: 0 /100 WBCS — SIGNIFICANT CHANGE UP (ref 0–0)
PLATELET # BLD AUTO: 256 K/UL — SIGNIFICANT CHANGE UP (ref 150–400)
POTASSIUM SERPL-MCNC: 3.5 MMOL/L — SIGNIFICANT CHANGE UP (ref 3.5–5.3)
POTASSIUM SERPL-SCNC: 3.5 MMOL/L — SIGNIFICANT CHANGE UP (ref 3.5–5.3)
PROT SERPL-MCNC: 7 G/DL — SIGNIFICANT CHANGE UP (ref 6–8.3)
RBC # BLD: 5.06 M/UL — SIGNIFICANT CHANGE UP (ref 4.2–5.8)
RBC # FLD: 12.5 % — SIGNIFICANT CHANGE UP (ref 10.3–14.5)
S AUREUS DNA NOSE QL NAA+PROBE: DETECTED
SODIUM SERPL-SCNC: 143 MMOL/L — SIGNIFICANT CHANGE UP (ref 135–145)
SPECIMEN SOURCE: SIGNIFICANT CHANGE UP
WBC # BLD: 5.45 K/UL — SIGNIFICANT CHANGE UP (ref 3.8–10.5)
WBC # FLD AUTO: 5.45 K/UL — SIGNIFICANT CHANGE UP (ref 3.8–10.5)

## 2024-03-20 PROCEDURE — 99232 SBSQ HOSP IP/OBS MODERATE 35: CPT

## 2024-03-20 RX ORDER — MUPIROCIN 20 MG/G
1 OINTMENT TOPICAL
Refills: 0 | Status: DISCONTINUED | OUTPATIENT
Start: 2024-03-20 | End: 2024-03-25

## 2024-03-20 RX ORDER — ALBUTEROL 90 UG/1
2 AEROSOL, METERED ORAL
Refills: 0 | DISCHARGE

## 2024-03-20 RX ADMIN — Medication 3 MILLIGRAM(S): at 22:03

## 2024-03-20 RX ADMIN — Medication 100 MILLIGRAM(S): at 22:01

## 2024-03-20 RX ADMIN — Medication 100 MILLIGRAM(S): at 05:08

## 2024-03-20 RX ADMIN — Medication 100 MILLIGRAM(S): at 13:31

## 2024-03-20 RX ADMIN — MUPIROCIN 1 APPLICATION(S): 20 OINTMENT TOPICAL at 19:35

## 2024-03-20 NOTE — CONSULT NOTE ADULT - ASSESSMENT
58 yo pleasant M no PMHx who presents for abnormal labs. Pt had presented to the ED on 3/16 with complaints of fever and abnormal CXR showing consolidation in the R lung. Was discharged from the ED and blood cultures grew MSSA. Attempts to reach to pt were unsuccesful but pt was at PCP today where the MSSA bacteremia was noted and pt was sent to the ED. Symptomatically pt states he is improving, fevers have improved. No arthralgias or myalgias, no palpitations, no N/V, dysuria, no abdominal pain.        1. bacteremia mssa  No clear source of bacteremia.  Pt has absence of any cardiac history.  He has no chest discomfort, SOB, palpn.    He is active with no problem and puts in 10k steps daily.  Absence of FH for significant cardiac condition.   Absence of murmur.  EKG is normal.  Echo has no valve finding.  At this point, pt does not have underlying risk for endocarditis.    will await ID input.  Option of JIMMY was discussed with pt and he understand procedure and would be agreeable.      Will follow.

## 2024-03-20 NOTE — CONSULT NOTE ADULT - SUBJECTIVE AND OBJECTIVE BOX
CARDIOLOGY CONSULT NOTE    Patient is a 59y Male with a known history of :    HPI:  58 yo pleasant M no PMHx who presents for abnormal labs. Pt had presented to the ED on 3/16 with complaints of fever and abnormal CXR showing consolidation in the R lung. Was discharged from the ED and blood cultures grew MSSA. Attempts to reach to pt were unsuccesful but pt was at PCP today where the MSSA bacteremia was noted and pt was sent to the ED. Symptomatically pt states he is improving, fevers have improved. No arthralgias or myalgias, no palpitations, no N/V, dysuria, no abdominal pain.  (19 Mar 2024 01:29)      REVIEW OF SYSTEMS:    CONSTITUTIONAL: No fever, weight loss, or fatigue  NECK: No pain or stiffness  RESPIRATORY: No cough, wheezing, chills or hemoptysis; No shortness of breath  CARDIOVASCULAR: No chest pain, palpitations, dizziness, or leg swelling  GASTROINTESTINAL: No abdominal or epigastric pain. No melena or hematochezia.  GENITOURINARY: No dysuria, frequency, hematuria, or incontinence  NEUROLOGICAL: No headaches, memory loss, loss of strength, numbness, or tremors  SKIN: No itching, burning, rashes, or lesions   MUSCULOSKELETAL: No joint pain or swelling; No muscle, back, or extremity pain  PSYCHIATRIC: No depression, anxiety, mood swings, or difficulty sleeping  HEME/LYMPH: No easy bruising, or bleeding gums  ALLERGY AND IMMUNOLOGIC: No hives or eczema    MEDICATIONS  (STANDING):  ceFAZolin   IVPB 2000 milliGRAM(s) IV Intermittent every 8 hours    MEDICATIONS  (PRN):  acetaminophen     Tablet .. 650 milliGRAM(s) Oral every 6 hours PRN Temp greater or equal to 38C (100.4F), Mild Pain (1 - 3)  aluminum hydroxide/magnesium hydroxide/simethicone Suspension 30 milliLiter(s) Oral every 4 hours PRN Dyspepsia  melatonin 3 milliGRAM(s) Oral at bedtime PRN Insomnia  ondansetron Injectable 4 milliGRAM(s) IV Push every 8 hours PRN Nausea and/or Vomiting      ALLERGIES: No Known Allergies      FAMILY HISTORY:      PHYSICAL EXAMINATION:  -----------------------------  T(C): 37.4 (03-20-24 @ 05:17), Max: 37.4 (03-20-24 @ 05:17)  HR: 57 (03-20-24 @ 05:17) (57 - 76)  BP: 146/85 (03-20-24 @ 05:17) (138/83 - 147/84)  RR: 18 (03-20-24 @ 05:17) (16 - 18)  SpO2: 92% (03-20-24 @ 05:17) (92% - 99%)  Wt(kg): --    03-19 @ 07:01  -  03-20 @ 07:00  --------------------------------------------------------  IN:    IV PiggyBack: 150 mL    Oral Fluid: 1410 mL  Total IN: 1560 mL    OUT:  Total OUT: 0 mL    Total NET: 1560 mL            Constitutional: well developed, normal appearance,  no acute distress.   HEENT: normal oral mucosa, no oral pallor and no oral cyanosis.   Neck: normal jugular venous    Pulmonary: no respiratory distress,  lungs were clear  Cardiovascular: normal S1 and S2 and no murmur  Abdomen: soft, non-tender  Musculoskeletal: the gait could not be assessed..   Extremities: no edema   Skin: normal skin color and pigmentation, no rash   Psychiatric:   the mood was normal and not feeling anxious.     LABS:   --------  03-19    142  |  107  |  10  ----------------------------<  112<H>  3.5   |  26  |  0.71    Ca    8.4<L>      19 Mar 2024 04:36    TPro  6.6  /  Alb  3.1<L>  /  TBili  0.5  /  DBili  x   /  AST  21  /  ALT  34  /  AlkPhos  50  03-19                         13.1   5.77  )-----------( 210      ( 19 Mar 2024 04:36 )             39.1     PT/INR - ( 18 Mar 2024 23:10 )   PT: 11.9 sec;   INR: 1.02 ratio         PTT - ( 18 Mar 2024 23:10 )  PTT:29.5 sec        Culture Results:   Growth in aerobic bottle: Gram Positive Cocci in Clusters *!* (03-18 @ 23:10)  Culture Results:   No growth (03-18 @ 23:10)  Culture Results:   Growth in aerobic bottle: Gram Positive Cocci in Clusters *!* (03-18 @ 23:00)      RADIOLOGY:  -----------------        ECG:  ANGELITA MEADEL

## 2024-03-20 NOTE — PROGRESS NOTE ADULT - ASSESSMENT
58 yo M no PMHx who presents for abnormal labs. Admitted for treatment of MSSA bacteremia.    #MSSA bacteremia  c/w cefazolin 2 g q8h  TTE order to evaluate for vegetations- normal EF, no definitive vegetations seen  ID consult placed with Dr Leger- will likely need JIMMY as no source identified- Cardio- Dr. Guerrero consulted, appreciate recs- will try to arrange for JIMMY (possible shuttle to different facility)  Tylenol for fever  Follow up repeat cultures- positive for MSSA, will recheck blood cultures today  CT abd/pelvis- no acute abdominal pathology, noted 1.8cm hepatic cyst and subcentimeter hepatic lesion- patient states he was made aware on prior ED visit, will follow up wit GI as outpatient    #Coronavirus  -NOT COVID-19  -Supportive care    #DVT ppx: SCDs

## 2024-03-21 LAB
CULTURE RESULTS: ABNORMAL
CULTURE RESULTS: ABNORMAL
GRAM STN FLD: ABNORMAL

## 2024-03-21 PROCEDURE — 99232 SBSQ HOSP IP/OBS MODERATE 35: CPT

## 2024-03-21 RX ADMIN — MUPIROCIN 1 APPLICATION(S): 20 OINTMENT TOPICAL at 17:44

## 2024-03-21 RX ADMIN — MUPIROCIN 1 APPLICATION(S): 20 OINTMENT TOPICAL at 06:10

## 2024-03-21 RX ADMIN — Medication 100 MILLIGRAM(S): at 21:35

## 2024-03-21 RX ADMIN — Medication 100 MILLIGRAM(S): at 14:39

## 2024-03-21 RX ADMIN — Medication 100 MILLIGRAM(S): at 06:10

## 2024-03-21 NOTE — PROGRESS NOTE ADULT - ASSESSMENT
60 yo M no PMHx who presents for abnormal labs. Admitted for treatment of MSSA bacteremia.    #MSSA bacteremia  c/w cefazolin 2 g q8h  TTE order to evaluate for vegetations- normal EF, no definitive vegetations seen  ID consult placed with Dr Leger- will likely need JIMMY as no source identified- Cardio- Dr. Guerrero consulted, appreciate recs- will try to arrange for JIMMY- plan for JIMMY on 3/22/24 at 8:30 AM with Dr. Eliseo Riggins- f/u results  Tylenol for fever  Blood cultures from 3/20- pending  CT abd/pelvis- no acute abdominal pathology, noted 1.8cm hepatic cyst and subcentimeter hepatic lesion- patient states he was made aware on prior ED visit, will follow up wit GI as outpatient    #Coronavirus  -NOT COVID-19  -Supportive care    #DVT ppx: SCDs

## 2024-03-22 ENCOUNTER — RESULT REVIEW (OUTPATIENT)
Age: 59
End: 2024-03-22

## 2024-03-22 ENCOUNTER — TRANSCRIPTION ENCOUNTER (OUTPATIENT)
Age: 59
End: 2024-03-22

## 2024-03-22 DIAGNOSIS — Z29.9 ENCOUNTER FOR PROPHYLACTIC MEASURES, UNSPECIFIED: ICD-10-CM

## 2024-03-22 DIAGNOSIS — J06.9 ACUTE UPPER RESPIRATORY INFECTION, UNSPECIFIED: ICD-10-CM

## 2024-03-22 DIAGNOSIS — R93.89 ABNORMAL FINDINGS ON DIAGNOSTIC IMAGING OF OTHER SPECIFIED BODY STRUCTURES: ICD-10-CM

## 2024-03-22 DIAGNOSIS — R78.81 BACTEREMIA: ICD-10-CM

## 2024-03-22 LAB
ANION GAP SERPL CALC-SCNC: 6 MMOL/L — SIGNIFICANT CHANGE UP (ref 5–17)
BUN SERPL-MCNC: 13 MG/DL — SIGNIFICANT CHANGE UP (ref 7–23)
CALCIUM SERPL-MCNC: 9.1 MG/DL — SIGNIFICANT CHANGE UP (ref 8.5–10.1)
CHLORIDE SERPL-SCNC: 107 MMOL/L — SIGNIFICANT CHANGE UP (ref 96–108)
CO2 SERPL-SCNC: 31 MMOL/L — SIGNIFICANT CHANGE UP (ref 22–31)
CREAT SERPL-MCNC: 0.91 MG/DL — SIGNIFICANT CHANGE UP (ref 0.5–1.3)
EGFR: 98 ML/MIN/1.73M2 — SIGNIFICANT CHANGE UP
GLUCOSE SERPL-MCNC: 91 MG/DL — SIGNIFICANT CHANGE UP (ref 70–99)
GRAM STN FLD: ABNORMAL
GRAM STN FLD: ABNORMAL
HCT VFR BLD CALC: 42.4 % — SIGNIFICANT CHANGE UP (ref 39–50)
HGB BLD-MCNC: 13.8 G/DL — SIGNIFICANT CHANGE UP (ref 13–17)
MCHC RBC-ENTMCNC: 28 PG — SIGNIFICANT CHANGE UP (ref 27–34)
MCHC RBC-ENTMCNC: 32.5 GM/DL — SIGNIFICANT CHANGE UP (ref 32–36)
MCV RBC AUTO: 86 FL — SIGNIFICANT CHANGE UP (ref 80–100)
NRBC # BLD: 0 /100 WBCS — SIGNIFICANT CHANGE UP (ref 0–0)
PLATELET # BLD AUTO: 315 K/UL — SIGNIFICANT CHANGE UP (ref 150–400)
POTASSIUM SERPL-MCNC: 4.6 MMOL/L — SIGNIFICANT CHANGE UP (ref 3.5–5.3)
POTASSIUM SERPL-SCNC: 4.6 MMOL/L — SIGNIFICANT CHANGE UP (ref 3.5–5.3)
RBC # BLD: 4.93 M/UL — SIGNIFICANT CHANGE UP (ref 4.2–5.8)
RBC # FLD: 12.5 % — SIGNIFICANT CHANGE UP (ref 10.3–14.5)
SODIUM SERPL-SCNC: 144 MMOL/L — SIGNIFICANT CHANGE UP (ref 135–145)
WBC # BLD: 7.13 K/UL — SIGNIFICANT CHANGE UP (ref 3.8–10.5)
WBC # FLD AUTO: 7.13 K/UL — SIGNIFICANT CHANGE UP (ref 3.8–10.5)

## 2024-03-22 PROCEDURE — 73701 CT LOWER EXTREMITY W/DYE: CPT | Mod: 26,LT,76

## 2024-03-22 PROCEDURE — 72126 CT NECK SPINE W/DYE: CPT | Mod: 26

## 2024-03-22 PROCEDURE — 76376 3D RENDER W/INTRP POSTPROCES: CPT | Mod: 26

## 2024-03-22 PROCEDURE — 99232 SBSQ HOSP IP/OBS MODERATE 35: CPT | Mod: GC

## 2024-03-22 PROCEDURE — 72129 CT CHEST SPINE W/DYE: CPT | Mod: 26

## 2024-03-22 PROCEDURE — 99232 SBSQ HOSP IP/OBS MODERATE 35: CPT

## 2024-03-22 PROCEDURE — 72132 CT LUMBAR SPINE W/DYE: CPT | Mod: 26

## 2024-03-22 RX ORDER — SODIUM CHLORIDE 9 MG/ML
1000 INJECTION, SOLUTION INTRAVENOUS
Refills: 0 | Status: DISCONTINUED | OUTPATIENT
Start: 2024-03-22 | End: 2024-03-24

## 2024-03-22 RX ADMIN — MUPIROCIN 1 APPLICATION(S): 20 OINTMENT TOPICAL at 05:43

## 2024-03-22 RX ADMIN — Medication 100 MILLIGRAM(S): at 05:43

## 2024-03-22 RX ADMIN — Medication 100 MILLIGRAM(S): at 13:13

## 2024-03-22 RX ADMIN — MUPIROCIN 1 APPLICATION(S): 20 OINTMENT TOPICAL at 18:26

## 2024-03-22 RX ADMIN — SODIUM CHLORIDE 50 MILLILITER(S): 9 INJECTION, SOLUTION INTRAVENOUS at 18:25

## 2024-03-22 RX ADMIN — Medication 100 MILLIGRAM(S): at 21:15

## 2024-03-22 RX ADMIN — SODIUM CHLORIDE 50 MILLILITER(S): 9 INJECTION, SOLUTION INTRAVENOUS at 07:05

## 2024-03-22 NOTE — PROVIDER CONTACT NOTE (CRITICAL VALUE NOTIFICATION) - TEST AND RESULT REPORTED:
Blood cultures from 3/18: growth in aerobic bottle- staphylococcus aureus, growth in anaerobic bottle- gram positive cocci in clusters
Blood cultures from 3/20: Growth in anaerobic bottle. Gram + cocci and clusters.
positive blood culture
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 Valentino.    The diagnosis and plan of management were reviewed with him prior to operation, and again on the day of surgery.    All questions answered, consent on chart

## 2024-03-22 NOTE — CONSULT NOTE ADULT - SUBJECTIVE AND OBJECTIVE BOX
[facility] [place]     Patient is a 59y old  Male who presents with a chief complaint of MSSA bacteremia (21 Mar 2024 17:36)      HPI:  58 yo pleasant M no PMHx who presents for abnormal labs. Pt had presented to the ED on 3/16 with complaints of fever and abnormal CXR showing consolidation in the R lung. Was discharged from the ED and blood cultures grew MSSA. Attempts to reach to pt were unsuccesful but pt was at PCP today where the MSSA bacteremia was noted and pt was sent to the ED. Symptomatically pt states he is improving, fevers have improved. No arthralgias or myalgias, no palpitations, no N/V, dysuria, no abdominal pain.  (19 Mar 2024 01:29)      HPI:    59y Male for Cardiology Consult    PAST MEDICAL & SURGICAL HISTORY:  No pertinent past medical history          FAMILY HISTORY:      SOCIAL HISTORY:   Alcohol:  Smoking:    Allergies    No Known Allergies    Intolerances        MEDICATIONS  (STANDING):  ceFAZolin   IVPB 2000 milliGRAM(s) IV Intermittent every 8 hours  dextrose 5% + sodium chloride 0.45%. 1000 milliLiter(s) (50 mL/Hr) IV Continuous <Continuous>  mupirocin 2% Nasal 1 Application(s) Both Nostrils two times a day    MEDICATIONS  (PRN):  acetaminophen     Tablet .. 650 milliGRAM(s) Oral every 6 hours PRN Temp greater or equal to 38C (100.4F), Mild Pain (1 - 3)  aluminum hydroxide/magnesium hydroxide/simethicone Suspension 30 milliLiter(s) Oral every 4 hours PRN Dyspepsia  melatonin 3 milliGRAM(s) Oral at bedtime PRN Insomnia  ondansetron Injectable 4 milliGRAM(s) IV Push every 8 hours PRN Nausea and/or Vomiting      Vital Signs Last 24 Hrs  T(C): 36.4 (22 Mar 2024 05:31), Max: 37.1 (21 Mar 2024 20:00)  T(F): 97.5 (22 Mar 2024 05:31), Max: 98.7 (21 Mar 2024 20:00)  HR: 78 (22 Mar 2024 08:36) (64 - 78)  BP: 121/71 (22 Mar 2024 08:36) (121/71 - 128/83)  BP(mean): --  RR: 18 (22 Mar 2024 08:36) (18 - 18)  SpO2: 98% (22 Mar 2024 08:36) (92% - 98%)    Parameters below as of 22 Mar 2024 08:36  Patient On (Oxygen Delivery Method): room air    LABS:                        13.8   7.13  )-----------( 315      ( 22 Mar 2024 05:58 )             42.4     03-22    144  |  107  |  13  ----------------------------<  91  4.6   |  31  |  0.91    Ca    9.1      22 Mar 2024 05:58    TPro  7.0  /  Alb  3.2<L>  /  TBili  0.5  /  DBili  x   /  AST  18  /  ALT  30  /  AlkPhos  53  03-20          Urinalysis Basic - ( 22 Mar 2024 05:58 )    Color: x / Appearance: x / SG: x / pH: x  Gluc: 91 mg/dL / Ketone: x  / Bili: x / Urobili: x   Blood: x / Protein: x / Nitrite: x   Leuk Esterase: x / RBC: x / WBC x   Sq Epi: x / Non Sq Epi: x / Bacteria: x              Assessment and Plan :  FULL CONSULT DICTATED .   58 yo pleasant M no PMH who presents for abnormal labs. Pt had presented to the ED on 3/16 with complaints of fever and abnormal CXR showing consolidation in the R lung. Was discharged from the ED and blood cultures grew MSSA. Attempts to reach to pt were unsuccessful but pt was at PCP today where the MSSA bacteremia was noted and pt was sent to the ED. Symptomatically pt states he is improving, fevers have improved. No arthralgias or myalgias, no palpitations, no N/V, dysuria, no abdominal pain.  (19 Mar 2024 01:29)  Patient has sepsis and positive blood cultures and JIMMY recommended by ID to R/O intra cardiac vegetations . Patient cardiac wise stable and cleared for JIMMY . Risk and benefits of JIMMY explained to patient .   Will continue to follow during hospital course and give further recommendations as needed . Thanks for your referral . if any questions please contact me at office ([phone] cell [phone])  RASHID LAYNE MD [place] [id number]  [address]  OFFICE : [phone]  CELL : [phone]  CARDIOLOGY CONSULT ;     Patient is a 59y old  Male who presents with a chief complaint of MSSA bacteremia (21 Mar 2024 17:36)      HPI:  58 yo pleasant M no PMHx who presents for abnormal labs. Pt had presented to the ED on 3/16 with complaints of fever and abnormal CXR showing consolidation in the R lung. Was discharged from the ED and blood cultures grew MSSA. Attempts to reach to pt were unsuccesful but pt was at PCP today where the MSSA bacteremia was noted and pt was sent to the ED. Symptomatically pt states he is improving, fevers have improved. No arthralgias or myalgias, no palpitations, no N/V, dysuria, no abdominal pain.  (19 Mar 2024 01:29)      HPI:    59y Male for Cardiology Consult    PAST MEDICAL & SURGICAL HISTORY:  No pertinent past medical history          FAMILY HISTORY:      SOCIAL HISTORY:   Alcohol:  Smoking:    Allergies    No Known Allergies    Intolerances        MEDICATIONS  (STANDING):  ceFAZolin   IVPB 2000 milliGRAM(s) IV Intermittent every 8 hours  dextrose 5% + sodium chloride 0.45%. 1000 milliLiter(s) (50 mL/Hr) IV Continuous <Continuous>  mupirocin 2% Nasal 1 Application(s) Both Nostrils two times a day    MEDICATIONS  (PRN):  acetaminophen     Tablet .. 650 milliGRAM(s) Oral every 6 hours PRN Temp greater or equal to 38C (100.4F), Mild Pain (1 - 3)  aluminum hydroxide/magnesium hydroxide/simethicone Suspension 30 milliLiter(s) Oral every 4 hours PRN Dyspepsia  melatonin 3 milliGRAM(s) Oral at bedtime PRN Insomnia  ondansetron Injectable 4 milliGRAM(s) IV Push every 8 hours PRN Nausea and/or Vomiting      Vital Signs Last 24 Hrs  T(C): 36.4 (22 Mar 2024 05:31), Max: 37.1 (21 Mar 2024 20:00)  T(F): 97.5 (22 Mar 2024 05:31), Max: 98.7 (21 Mar 2024 20:00)  HR: 78 (22 Mar 2024 08:36) (64 - 78)  BP: 121/71 (22 Mar 2024 08:36) (121/71 - 128/83)  BP(mean): --  RR: 18 (22 Mar 2024 08:36) (18 - 18)  SpO2: 98% (22 Mar 2024 08:36) (92% - 98%)    Parameters below as of 22 Mar 2024 08:36  Patient On (Oxygen Delivery Method): room air    LABS:                        13.8   7.13  )-----------( 315      ( 22 Mar 2024 05:58 )             42.4     03-22    144  |  107  |  13  ----------------------------<  91  4.6   |  31  |  0.91    Ca    9.1      22 Mar 2024 05:58    TPro  7.0  /  Alb  3.2<L>  /  TBili  0.5  /  DBili  x   /  AST  18  /  ALT  30  /  AlkPhos  53  03-20          Urinalysis Basic - ( 22 Mar 2024 05:58 )    Color: x / Appearance: x / SG: x / pH: x  Gluc: 91 mg/dL / Ketone: x  / Bili: x / Urobili: x   Blood: x / Protein: x / Nitrite: x   Leuk Esterase: x / RBC: x / WBC x   Sq Epi: x / Non Sq Epi: x / Bacteria: x              Assessment and Plan :  FULL CONSULT DICTATED .   58 yo pleasant M no PMH who presents for abnormal labs. Pt had presented to the ED on 3/16 with complaints of fever and abnormal CXR showing consolidation in the R lung. Was discharged from the ED and blood cultures grew MSSA. Attempts to reach to pt were unsuccessful but pt was at PCP today where the MSSA bacteremia was noted and pt was sent to the ED. Symptomatically pt states he is improving, fevers have improved. No arthralgias or myalgias, no palpitations, no N/V, dysuria, no abdominal pain.  (19 Mar 2024 01:29)  Patient has sepsis and positive blood cultures and JIMMY recommended by ID to R/O intra cardiac vegetations . Patient cardiac wise stable and cleared for JIMMY . Risk and benefits of JIMMY explained to patient .   JIMMY :  Patient was giving I/V conscious sedation by anesthesiologist and JIMMY probe inserted in esophagus and cardiac images done  different planes .   Patient tolerated JIMMY  procedure very well .  FINDINGS :  1) NO INTRA CARDIAC MASS , THROMBUS , VEGETATIONS AND TUMOR .  2) Normal LV systolic function and normal LV size and LV EF 60% .  3) Mild MR   4) Mild TR and no pulmonary hypertension .   5) Normal left atrial appendage .  6) Normal RV and right atrial size and normal RV  systolic function .  7) No thoracic aortic aneurysm and no aortic dissection .  8) No pericardial effusion .  Correlate clinically above findings .  Thanks for referral . IF any questions please call me on my cell phone [phone]

## 2024-03-22 NOTE — DISCHARGE NOTE PROVIDER - NSDCMRMEDTOKEN_GEN_ALL_CORE_FT
Albuterol (Eqv-ProAir HFA) 90 mcg/inh inhalation aerosol: 2 puff(s) inhaled 4 times a day as needed for  shortness of breath and/or wheezing   Albuterol (Eqv-ProAir HFA) 90 mcg/inh inhalation aerosol: 2 puff(s) inhaled 4 times a day as needed for  shortness of breath and/or wheezing  ceFAZolin 2 g intravenous injection: 2 gram(s) intravenous every 8 hours

## 2024-03-22 NOTE — DISCHARGE NOTE PROVIDER - PROVIDER TOKENS
PROVIDER:[TOKEN:[[id number]]] PROVIDER:[TOKEN:[[id number]]],PROVIDER:[TOKEN:[[id number]]]

## 2024-03-22 NOTE — CASE MANAGEMENT PROGRESS NOTE - NSCMPROGRESSNOTE_GEN_ALL_CORE
Discussed patient on rounds this morning and with MD. Weekend discharge is not anticipated. s/p JIMMY today; Prelim Blood cultures remain positive. Repeat blood cultures ordered and need to be negative prior to discharge. [facility] has accepted case when medically cleared. CM remains available.

## 2024-03-22 NOTE — DISCHARGE NOTE PROVIDER - NSDCCPCAREPLAN_GEN_ALL_CORE_FT
PRINCIPAL DISCHARGE DIAGNOSIS  Diagnosis: MSSA bacteremia  Assessment and Plan of Treatment: You were admitted with MSSA bactermia which is a blood infection. You had multiple blood cultures that were positive. You had an echocardiogram performed to make sure there was not infection in the heart and it was negative. You had CT scans of the spine and hips and these were also negative for a source of infection.        PRINCIPAL DISCHARGE DIAGNOSIS  Diagnosis: MSSA bacteremia  Assessment and Plan of Treatment: You were admitted with MSSA bactermia which is a blood infection. You had multiple blood cultures that were positive. You had an echocardiogram performed to make sure there was not infection in the heart and it was negative. You had CT scans of the spine and hips and these were also negative for a source of infection. You had a long-term IV line (called a PICC line) placed for your antibiotics. You are to continue these antibiotics for a total of 6 weeks.        PRINCIPAL DISCHARGE DIAGNOSIS  Diagnosis: MSSA bacteremia  Assessment and Plan of Treatment: You were admitted with MSSA bactermia which is a blood infection. You had multiple blood cultures that were positive. You had an echocardiogram performed to make sure there was not infection in the heart and it was negative. You had CT scans of the spine and hips and these were also negative for a source of infection. You had a long-term IV line (called a PICC line) placed for your antibiotics. You are to continue these antibiotics for a total of 6 weeks (end date 5/2/24).   Please follow-up with your PCP within 1 week of discharge.  Please follow-up with an infectious disease physician upon discharge.      SECONDARY DISCHARGE DIAGNOSES  Diagnosis: Abnormal CT scan  Assessment and Plan of Treatment: Abdominal CT Scan: 1.8 cm right hepatic dome cyst and additional left hepatic subcentimeter low-density lesion.  Please follow-up with your PCP to discuss these results and to be evaluated if further testing is necessary.     PRINCIPAL DISCHARGE DIAGNOSIS  Diagnosis: MSSA bacteremia  Assessment and Plan of Treatment: You were admitted with MSSA bactermia which is a blood infection. You had multiple blood cultures that were positive. You had an echocardiogram performed to make sure there was not infection in the heart and it was negative. You had CT scans of the spine and hips and these were also negative for a source of infection. You had a long-term IV line (called a PICC line) placed for your antibiotics. You are to continue these antibiotics for a total of 6 weeks (end date 5/2/24).   You will need a weekly cbc, cmp (labs) and to follow up with Dr. Leger Infectious disease within the next few weeks.  Please follow-up with your PCP within 1 week of discharge.        SECONDARY DISCHARGE DIAGNOSES  Diagnosis: Abnormal CT scan  Assessment and Plan of Treatment: Abdominal CT Scan: 1.8 cm right hepatic dome cyst and additional left hepatic subcentimeter low-density lesion.  Please follow-up with your PCP to discuss these results and to be evaluated if further testing is necessary.

## 2024-03-22 NOTE — DISCHARGE NOTE PROVIDER - ATTENDING DISCHARGE PHYSICAL EXAMINATION:
Vital Signs Last 24 Hrs  T(C): 36.9 (25 Mar 2024 12:25), Max: 36.9 (25 Mar 2024 05:34)  T(F): 98.5 (25 Mar 2024 12:25), Max: 98.5 (25 Mar 2024 12:25)  HR: 69 (25 Mar 2024 12:25) (61 - 74)  BP: 129/73 (25 Mar 2024 12:25) (127/82 - 138/75)  BP(mean): --  RR: 18 (25 Mar 2024 12:25) (18 - 18)  SpO2: 96% (25 Mar 2024 12:25) (93% - 96%)    Parameters below as of 25 Mar 2024 12:25  Patient On (Oxygen Delivery Method): room air    General: Well developed, well nourished, NAD  HEENT: NCAT, PERRLA, EOMI bl, moist mucous membranes   Neck: Supple, nontender, no mass  Neurology: A&Ox3, nonfocal  Respiratory: CTA B/L, No W/R/R  CV: RRR, +S1/S2, no murmurs, rubs or gallops  Abdominal: Soft, NT, ND +BSx4  Extremities: No C/C/E, + peripheral pulses  MSK: Normal ROM, no joint erythema or warmth, no joint swelling   Skin: warm, dry, normal color

## 2024-03-22 NOTE — DISCHARGE NOTE PROVIDER - HOSPITAL COURSE
HPI:  58 yo pleasant M no PMHx who presents for abnormal labs. Pt had presented to the ED on 3/16 with complaints of fever and abnormal CXR showing consolidation in the R lung. Was discharged from the ED and blood cultures grew MSSA. Attempts to reach to pt were unsuccesful but pt was at PCP today where the MSSA bacteremia was noted and pt was sent to the ED. Symptomatically pt states he is improving, fevers have improved. No arthralgias or myalgias, no palpitations, no N/V, dysuria, no abdominal pain.  (19 Mar 2024 01:29)      ---  HOSPITAL COURSE:   Patient admitted for treatment of MSSA bacteremia.    ID consulted, cefazolin 2gm q8h. Cardiology consulted. TTE showed -> normal EF, no definitive vegetations.   JIMMY (03/22/24) was also negative for vegetation.   CT cervical/thoracic/ Lumbar spine + CT B/L hip w/wo IV contrast negative for any acute infections.   Blood cultures (3/20/24) grew gram positive cocci in clusters. Repeat blood cultures ______  Patient also found to coronavirus (not covid19) --> treated with supportive care.   CT Abd/Pelvis showed noted 1.8cm hepatic cyst and subcentimeter hepatic lesion, will require outpatient follow up.         Patient's clinical condition improved throughout hospital course and patient is stable for discharge *** with close outpatient follow up.     ---  CONSULTANTS:   ID: Dr. Leger  Cardio: Dr. Guerrero     ---  Physical Exam on the day of discharge:    ---  TIME SPENT:  I, the attending physician, was physically present for the key portions of the evaluation and management (E/M) service provided. The total amount of time spent reviewing the hospital notes, laboratory values, imaging findings, assessing/counseling the patient, discussing with consultant physicians, social work, nursing staff was -- minutes    ---  Primary care provider was made aware of plan for discharge:      [  ] NO     [  ] YES HPI:  60 yo pleasant M no PMHx who presents for abnormal labs. Pt had presented to the ED on 3/16 with complaints of fever and abnormal CXR showing consolidation in the R lung. Was discharged from the ED and blood cultures grew MSSA. Attempts to reach to pt were unsuccesful but pt was at PCP today where the MSSA bacteremia was noted and pt was sent to the ED. Symptomatically pt states he is improving, fevers have improved. No arthralgias or myalgias, no palpitations, no N/V, dysuria, no abdominal pain.  (19 Mar 2024 01:29)      ---  HOSPITAL COURSE:   Patient admitted for treatment of MSSA bacteremia.    ID consulted, cefazolin 2gm q8h. Cardiology consulted. TTE showed -> normal EF, no definitive vegetations.   JIMMY (03/22/24) was also negative for vegetation.   CT cervical/thoracic/ Lumbar spine + CT B/L hip w/wo IV contrast negative for any acute infections.   Blood cultures (3/20/24) grew gram positive cocci in clusters. Repeat blood cultures ______  Patient also found to coronavirus (not covid19) --> treated with supportive care.   CT Abd/Pelvis showed noted 1.8cm hepatic cyst and subcentimeter hepatic lesion, will require outpatient follow up.         Patient's clinical condition improved throughout hospital course and patient is stable for discharge *** with close outpatient follow up.     ---  CONSULTANTS:   ID: Dr. Leger  Cardio: Dr. Guerrero     ---  Physical Exam on the day of discharge:    --- HPI:  58 yo pleasant M no PMHx who presents for abnormal labs. Pt had presented to the ED on 3/16 with complaints of fever and abnormal CXR showing consolidation in the R lung. Was discharged from the ED and blood cultures grew MSSA. Attempts to reach to pt were unsuccesful but pt was at PCP today where the MSSA bacteremia was noted and pt was sent to the ED. Symptomatically pt states he is improving, fevers have improved. No arthralgias or myalgias, no palpitations, no N/V, dysuria, no abdominal pain.  (19 Mar 2024 01:29)      ---  HOSPITAL COURSE:   Patient admitted for treatment of MSSA bacteremia.    ID consulted, cefazolin 2gm q8h. Cardiology consulted. TTE showed -> normal EF, no definitive vegetations.   JIMMY (03/22/24) was also negative for vegetation.   CT cervical/thoracic/ Lumbar spine + CT B/L hip w/wo IV contrast negative for any acute infections.   Blood cultures (3/20/24) grew gram positive cocci in clusters. Repeat blood cultures ***NGTD prelim ***  Patient also found to coronavirus (not covid19) --> treated with supportive care.   CT Abd/Pelvis showed noted 1.8cm hepatic cyst and subcentimeter hepatic lesion, will require outpatient follow up.   Pt had ***PICC line placed by IR and planned to treat for 6 weeks IV cefazolin due to unknown source and prolonged bacteremia.         Patient's clinical condition improved throughout hospital course and patient is stable for discharge *** with close outpatient follow up.     ---  CONSULTANTS:   ID: Dr. Leger  Cardio: Dr. Guerrero     ---  Physical Exam on the day of discharge:    --- HPI:  60 yo pleasant M no PMHx who presents for abnormal labs. Pt had presented to the ED on 3/16 with complaints of fever and abnormal CXR showing consolidation in the R lung. Was discharged from the ED and blood cultures grew MSSA. Attempts to reach to pt were unsuccesful but pt was at PCP today where the MSSA bacteremia was noted and pt was sent to the ED. Symptomatically pt states he is improving, fevers have improved. No arthralgias or myalgias, no palpitations, no N/V, dysuria, no abdominal pain.  (19 Mar 2024 01:29)      ---  HOSPITAL COURSE:   Patient admitted for treatment of MSSA bacteremia.    ID consulted, cefazolin 2gm q8h. Cardiology consulted. TTE showed -> normal EF, no definitive vegetations.   JIMMY (03/22/24) was also negative for vegetation.   CT cervical/thoracic/ Lumbar spine + CT B/L hip w/wo IV contrast negative for any acute infections.   Blood cultures from 3/16, 3/18, 3/20 (all s. aureus), Last culture 3/22 was NGTD x 48  Patient also found to coronavirus (not covid19) --> treated with supportive care.   CT Abd/Pelvis showed noted 1.8cm hepatic cyst and subcentimeter hepatic lesion, will require outpatient follow up.   Pt had PICC line placed by IR and planned to treat for 6 weeks IV cefazolin due to unknown source and prolonged bacteremia, last day 5/2/24        Patient's clinical condition improved throughout hospital course and patient is stable for discharge with close outpatient follow up.     Vital Signs Last 24 Hrs  T(C): 36.9 (25 Mar 2024 12:25), Max: 36.9 (25 Mar 2024 05:34)  T(F): 98.5 (25 Mar 2024 12:25), Max: 98.5 (25 Mar 2024 12:25)  HR: 69 (25 Mar 2024 12:25) (61 - 74)  BP: 129/73 (25 Mar 2024 12:25) (127/82 - 138/75)  BP(mean): --  RR: 18 (25 Mar 2024 12:25) (18 - 18)  SpO2: 96% (25 Mar 2024 12:25) (93% - 96%)    Parameters below as of 25 Mar 2024 12:25  Patient On (Oxygen Delivery Method): room air    General: Well developed, well nourished, NAD  HEENT: NCAT, PERRLA, EOMI bl, moist mucous membranes   Neck: Supple, nontender, no mass  Neurology: A&Ox3, nonfocal  Respiratory: CTA B/L, No W/R/R  CV: RRR, +S1/S2, no murmurs, rubs or gallops  Abdominal: Soft, NT, ND +BSx4  Extremities: No C/C/E, + peripheral pulses  MSK: Normal ROM, no joint erythema or warmth, no joint swelling   Skin: warm, dry, normal color    ---  CONSULTANTS:   ID: Dr. Leger  Cardio: Dr. Guerrero     ---      ---

## 2024-03-22 NOTE — DISCHARGE NOTE PROVIDER - CARE PROVIDER_API CALL
Alfredo Paulson  Internal Medicine  [address] [place] [id number]  Phone: [phone]  Fax: [phone]  Follow Up Time:    Alfredo Paulson  Internal Medicine  [address] [place] [id number]  Phone: [phone]  Fax: [phone]  Follow Up Time:     Jean Pierre Leger  Infectious Disease  [address] [place] [id number]  Phone: [phone]  Fax: [phone]  Follow Up Time:

## 2024-03-22 NOTE — DISCHARGE NOTE PROVIDER - CARE PROVIDERS DIRECT ADDRESSES
[email or web address]

## 2024-03-23 LAB
-  AMPICILLIN/SULBACTAM: SIGNIFICANT CHANGE UP
-  CEFAZOLIN: SIGNIFICANT CHANGE UP
-  CLINDAMYCIN: SIGNIFICANT CHANGE UP
-  ERYTHROMYCIN: SIGNIFICANT CHANGE UP
-  GENTAMICIN: SIGNIFICANT CHANGE UP
-  OXACILLIN: SIGNIFICANT CHANGE UP
-  PENICILLIN: SIGNIFICANT CHANGE UP
-  RIFAMPIN: SIGNIFICANT CHANGE UP
-  TETRACYCLINE: SIGNIFICANT CHANGE UP
-  TRIMETHOPRIM/SULFAMETHOXAZOLE: SIGNIFICANT CHANGE UP
-  VANCOMYCIN: SIGNIFICANT CHANGE UP
ALBUMIN SERPL ELPH-MCNC: 3.2 G/DL — LOW (ref 3.3–5)
ALP SERPL-CCNC: 53 U/L — SIGNIFICANT CHANGE UP (ref 40–120)
ALT FLD-CCNC: 24 U/L — SIGNIFICANT CHANGE UP (ref 12–78)
ANION GAP SERPL CALC-SCNC: 6 MMOL/L — SIGNIFICANT CHANGE UP (ref 5–17)
AST SERPL-CCNC: 19 U/L — SIGNIFICANT CHANGE UP (ref 15–37)
BASOPHILS # BLD AUTO: 0.05 K/UL — SIGNIFICANT CHANGE UP (ref 0–0.2)
BASOPHILS NFR BLD AUTO: 0.8 % — SIGNIFICANT CHANGE UP (ref 0–2)
BILIRUB SERPL-MCNC: 0.4 MG/DL — SIGNIFICANT CHANGE UP (ref 0.2–1.2)
BUN SERPL-MCNC: 11 MG/DL — SIGNIFICANT CHANGE UP (ref 7–23)
CALCIUM SERPL-MCNC: 9 MG/DL — SIGNIFICANT CHANGE UP (ref 8.5–10.1)
CHLORIDE SERPL-SCNC: 105 MMOL/L — SIGNIFICANT CHANGE UP (ref 96–108)
CO2 SERPL-SCNC: 31 MMOL/L — SIGNIFICANT CHANGE UP (ref 22–31)
CREAT SERPL-MCNC: 0.78 MG/DL — SIGNIFICANT CHANGE UP (ref 0.5–1.3)
CULTURE RESULTS: ABNORMAL
CULTURE RESULTS: ABNORMAL
EGFR: 104 ML/MIN/1.73M2 — SIGNIFICANT CHANGE UP
EOSINOPHIL # BLD AUTO: 0.21 K/UL — SIGNIFICANT CHANGE UP (ref 0–0.5)
EOSINOPHIL NFR BLD AUTO: 3.3 % — SIGNIFICANT CHANGE UP (ref 0–6)
GLUCOSE SERPL-MCNC: 94 MG/DL — SIGNIFICANT CHANGE UP (ref 70–99)
HCT VFR BLD CALC: 42.3 % — SIGNIFICANT CHANGE UP (ref 39–50)
HGB BLD-MCNC: 13.7 G/DL — SIGNIFICANT CHANGE UP (ref 13–17)
IMM GRANULOCYTES NFR BLD AUTO: 0.5 % — SIGNIFICANT CHANGE UP (ref 0–0.9)
LYMPHOCYTES # BLD AUTO: 1.57 K/UL — SIGNIFICANT CHANGE UP (ref 1–3.3)
LYMPHOCYTES # BLD AUTO: 24.9 % — SIGNIFICANT CHANGE UP (ref 13–44)
MCHC RBC-ENTMCNC: 28.2 PG — SIGNIFICANT CHANGE UP (ref 27–34)
MCHC RBC-ENTMCNC: 32.4 GM/DL — SIGNIFICANT CHANGE UP (ref 32–36)
MCV RBC AUTO: 87 FL — SIGNIFICANT CHANGE UP (ref 80–100)
METHOD TYPE: SIGNIFICANT CHANGE UP
MONOCYTES # BLD AUTO: 0.54 K/UL — SIGNIFICANT CHANGE UP (ref 0–0.9)
MONOCYTES NFR BLD AUTO: 8.6 % — SIGNIFICANT CHANGE UP (ref 2–14)
NEUTROPHILS # BLD AUTO: 3.91 K/UL — SIGNIFICANT CHANGE UP (ref 1.8–7.4)
NEUTROPHILS NFR BLD AUTO: 61.9 % — SIGNIFICANT CHANGE UP (ref 43–77)
NRBC # BLD: 0 /100 WBCS — SIGNIFICANT CHANGE UP (ref 0–0)
ORGANISM # SPEC MICROSCOPIC CNT: ABNORMAL
ORGANISM # SPEC MICROSCOPIC CNT: SIGNIFICANT CHANGE UP
PLATELET # BLD AUTO: 331 K/UL — SIGNIFICANT CHANGE UP (ref 150–400)
POTASSIUM SERPL-MCNC: 4.3 MMOL/L — SIGNIFICANT CHANGE UP (ref 3.5–5.3)
POTASSIUM SERPL-SCNC: 4.3 MMOL/L — SIGNIFICANT CHANGE UP (ref 3.5–5.3)
PROT SERPL-MCNC: 6.9 G/DL — SIGNIFICANT CHANGE UP (ref 6–8.3)
RBC # BLD: 4.86 M/UL — SIGNIFICANT CHANGE UP (ref 4.2–5.8)
RBC # FLD: 12.6 % — SIGNIFICANT CHANGE UP (ref 10.3–14.5)
SODIUM SERPL-SCNC: 142 MMOL/L — SIGNIFICANT CHANGE UP (ref 135–145)
SPECIMEN SOURCE: SIGNIFICANT CHANGE UP
SPECIMEN SOURCE: SIGNIFICANT CHANGE UP
WBC # BLD: 6.31 K/UL — SIGNIFICANT CHANGE UP (ref 3.8–10.5)
WBC # FLD AUTO: 6.31 K/UL — SIGNIFICANT CHANGE UP (ref 3.8–10.5)

## 2024-03-23 PROCEDURE — 99232 SBSQ HOSP IP/OBS MODERATE 35: CPT

## 2024-03-23 RX ADMIN — MUPIROCIN 1 APPLICATION(S): 20 OINTMENT TOPICAL at 05:46

## 2024-03-23 RX ADMIN — MUPIROCIN 1 APPLICATION(S): 20 OINTMENT TOPICAL at 17:21

## 2024-03-23 RX ADMIN — Medication 100 MILLIGRAM(S): at 11:28

## 2024-03-23 RX ADMIN — Medication 100 MILLIGRAM(S): at 05:46

## 2024-03-23 RX ADMIN — Medication 100 MILLIGRAM(S): at 22:07

## 2024-03-24 LAB
ALBUMIN SERPL ELPH-MCNC: 3.1 G/DL — LOW (ref 3.3–5)
ALP SERPL-CCNC: 56 U/L — SIGNIFICANT CHANGE UP (ref 40–120)
ALT FLD-CCNC: 23 U/L — SIGNIFICANT CHANGE UP (ref 12–78)
ANION GAP SERPL CALC-SCNC: 6 MMOL/L — SIGNIFICANT CHANGE UP (ref 5–17)
AST SERPL-CCNC: 19 U/L — SIGNIFICANT CHANGE UP (ref 15–37)
BASOPHILS # BLD AUTO: 0.05 K/UL — SIGNIFICANT CHANGE UP (ref 0–0.2)
BASOPHILS NFR BLD AUTO: 0.8 % — SIGNIFICANT CHANGE UP (ref 0–2)
BILIRUB SERPL-MCNC: 0.4 MG/DL — SIGNIFICANT CHANGE UP (ref 0.2–1.2)
BUN SERPL-MCNC: 10 MG/DL — SIGNIFICANT CHANGE UP (ref 7–23)
CALCIUM SERPL-MCNC: 8.9 MG/DL — SIGNIFICANT CHANGE UP (ref 8.5–10.1)
CHLORIDE SERPL-SCNC: 106 MMOL/L — SIGNIFICANT CHANGE UP (ref 96–108)
CO2 SERPL-SCNC: 30 MMOL/L — SIGNIFICANT CHANGE UP (ref 22–31)
CREAT SERPL-MCNC: 0.88 MG/DL — SIGNIFICANT CHANGE UP (ref 0.5–1.3)
EGFR: 100 ML/MIN/1.73M2 — SIGNIFICANT CHANGE UP
EOSINOPHIL # BLD AUTO: 0.17 K/UL — SIGNIFICANT CHANGE UP (ref 0–0.5)
EOSINOPHIL NFR BLD AUTO: 2.7 % — SIGNIFICANT CHANGE UP (ref 0–6)
GLUCOSE SERPL-MCNC: 98 MG/DL — SIGNIFICANT CHANGE UP (ref 70–99)
HCT VFR BLD CALC: 41.1 % — SIGNIFICANT CHANGE UP (ref 39–50)
HGB BLD-MCNC: 13.6 G/DL — SIGNIFICANT CHANGE UP (ref 13–17)
IMM GRANULOCYTES NFR BLD AUTO: 0.3 % — SIGNIFICANT CHANGE UP (ref 0–0.9)
LYMPHOCYTES # BLD AUTO: 1.84 K/UL — SIGNIFICANT CHANGE UP (ref 1–3.3)
LYMPHOCYTES # BLD AUTO: 28.9 % — SIGNIFICANT CHANGE UP (ref 13–44)
MCHC RBC-ENTMCNC: 28.7 PG — SIGNIFICANT CHANGE UP (ref 27–34)
MCHC RBC-ENTMCNC: 33.1 GM/DL — SIGNIFICANT CHANGE UP (ref 32–36)
MCV RBC AUTO: 86.7 FL — SIGNIFICANT CHANGE UP (ref 80–100)
MONOCYTES # BLD AUTO: 0.52 K/UL — SIGNIFICANT CHANGE UP (ref 0–0.9)
MONOCYTES NFR BLD AUTO: 8.2 % — SIGNIFICANT CHANGE UP (ref 2–14)
NEUTROPHILS # BLD AUTO: 3.76 K/UL — SIGNIFICANT CHANGE UP (ref 1.8–7.4)
NEUTROPHILS NFR BLD AUTO: 59.1 % — SIGNIFICANT CHANGE UP (ref 43–77)
NRBC # BLD: 0 /100 WBCS — SIGNIFICANT CHANGE UP (ref 0–0)
PLATELET # BLD AUTO: 336 K/UL — SIGNIFICANT CHANGE UP (ref 150–400)
POTASSIUM SERPL-MCNC: 4.5 MMOL/L — SIGNIFICANT CHANGE UP (ref 3.5–5.3)
POTASSIUM SERPL-SCNC: 4.5 MMOL/L — SIGNIFICANT CHANGE UP (ref 3.5–5.3)
PROT SERPL-MCNC: 6.8 G/DL — SIGNIFICANT CHANGE UP (ref 6–8.3)
RBC # BLD: 4.74 M/UL — SIGNIFICANT CHANGE UP (ref 4.2–5.8)
RBC # FLD: 12.1 % — SIGNIFICANT CHANGE UP (ref 10.3–14.5)
SODIUM SERPL-SCNC: 142 MMOL/L — SIGNIFICANT CHANGE UP (ref 135–145)
WBC # BLD: 6.36 K/UL — SIGNIFICANT CHANGE UP (ref 3.8–10.5)
WBC # FLD AUTO: 6.36 K/UL — SIGNIFICANT CHANGE UP (ref 3.8–10.5)

## 2024-03-24 PROCEDURE — 99232 SBSQ HOSP IP/OBS MODERATE 35: CPT

## 2024-03-24 RX ORDER — AMLODIPINE BESYLATE 2.5 MG/1
5 TABLET ORAL ONCE
Refills: 0 | Status: COMPLETED | OUTPATIENT
Start: 2024-03-24 | End: 2024-03-24

## 2024-03-24 RX ADMIN — Medication 100 MILLIGRAM(S): at 06:38

## 2024-03-24 RX ADMIN — MUPIROCIN 1 APPLICATION(S): 20 OINTMENT TOPICAL at 17:37

## 2024-03-24 RX ADMIN — Medication 100 MILLIGRAM(S): at 22:21

## 2024-03-24 RX ADMIN — Medication 100 MILLIGRAM(S): at 14:22

## 2024-03-24 RX ADMIN — MUPIROCIN 1 APPLICATION(S): 20 OINTMENT TOPICAL at 06:38

## 2024-03-24 NOTE — PROGRESS NOTE ADULT - TIME BILLING
as above
Note written by attending, see above.  Time spent: 35min. More than 50% of the visit was spent counseling the patient on medical condition and coordination of care
as above
Note written by attending, see above.  Time spent: 35min. More than 50% of the visit was spent counseling the patient on medical condition and coordination of care
as above
Pt seen + examined. Case discussed with resident. Agree with assessment and plan above (edited by me in detail):  Time spent: 36min. More than 50% of the visit was spent counseling the patient on medical condition and coordination of care, excluding teaching time.

## 2024-03-25 ENCOUNTER — TRANSCRIPTION ENCOUNTER (OUTPATIENT)
Age: 59
End: 2024-03-25

## 2024-03-25 VITALS
DIASTOLIC BLOOD PRESSURE: 73 MMHG | HEART RATE: 69 BPM | OXYGEN SATURATION: 96 % | SYSTOLIC BLOOD PRESSURE: 129 MMHG | TEMPERATURE: 98 F | RESPIRATION RATE: 18 BRPM

## 2024-03-25 LAB
ALBUMIN SERPL ELPH-MCNC: 3.2 G/DL — LOW (ref 3.3–5)
ALP SERPL-CCNC: 55 U/L — SIGNIFICANT CHANGE UP (ref 40–120)
ALT FLD-CCNC: 23 U/L — SIGNIFICANT CHANGE UP (ref 12–78)
ANION GAP SERPL CALC-SCNC: 8 MMOL/L — SIGNIFICANT CHANGE UP (ref 5–17)
AST SERPL-CCNC: 17 U/L — SIGNIFICANT CHANGE UP (ref 15–37)
BASOPHILS # BLD AUTO: 0.05 K/UL — SIGNIFICANT CHANGE UP (ref 0–0.2)
BASOPHILS NFR BLD AUTO: 0.7 % — SIGNIFICANT CHANGE UP (ref 0–2)
BILIRUB SERPL-MCNC: 0.5 MG/DL — SIGNIFICANT CHANGE UP (ref 0.2–1.2)
BUN SERPL-MCNC: 12 MG/DL — SIGNIFICANT CHANGE UP (ref 7–23)
CALCIUM SERPL-MCNC: 9.2 MG/DL — SIGNIFICANT CHANGE UP (ref 8.5–10.1)
CHLORIDE SERPL-SCNC: 108 MMOL/L — SIGNIFICANT CHANGE UP (ref 96–108)
CO2 SERPL-SCNC: 27 MMOL/L — SIGNIFICANT CHANGE UP (ref 22–31)
CREAT SERPL-MCNC: 0.74 MG/DL — SIGNIFICANT CHANGE UP (ref 0.5–1.3)
CRP SERPL-MCNC: 19 MG/L — HIGH
EGFR: 106 ML/MIN/1.73M2 — SIGNIFICANT CHANGE UP
EOSINOPHIL # BLD AUTO: 0.2 K/UL — SIGNIFICANT CHANGE UP (ref 0–0.5)
EOSINOPHIL NFR BLD AUTO: 2.6 % — SIGNIFICANT CHANGE UP (ref 0–6)
GLUCOSE SERPL-MCNC: 86 MG/DL — SIGNIFICANT CHANGE UP (ref 70–99)
HCT VFR BLD CALC: 42.8 % — SIGNIFICANT CHANGE UP (ref 39–50)
HGB BLD-MCNC: 14 G/DL — SIGNIFICANT CHANGE UP (ref 13–17)
IMM GRANULOCYTES NFR BLD AUTO: 0.3 % — SIGNIFICANT CHANGE UP (ref 0–0.9)
LYMPHOCYTES # BLD AUTO: 2.1 K/UL — SIGNIFICANT CHANGE UP (ref 1–3.3)
LYMPHOCYTES # BLD AUTO: 27.8 % — SIGNIFICANT CHANGE UP (ref 13–44)
MCHC RBC-ENTMCNC: 28.3 PG — SIGNIFICANT CHANGE UP (ref 27–34)
MCHC RBC-ENTMCNC: 32.7 GM/DL — SIGNIFICANT CHANGE UP (ref 32–36)
MCV RBC AUTO: 86.5 FL — SIGNIFICANT CHANGE UP (ref 80–100)
MONOCYTES # BLD AUTO: 0.71 K/UL — SIGNIFICANT CHANGE UP (ref 0–0.9)
MONOCYTES NFR BLD AUTO: 9.4 % — SIGNIFICANT CHANGE UP (ref 2–14)
NEUTROPHILS # BLD AUTO: 4.48 K/UL — SIGNIFICANT CHANGE UP (ref 1.8–7.4)
NEUTROPHILS NFR BLD AUTO: 59.2 % — SIGNIFICANT CHANGE UP (ref 43–77)
NRBC # BLD: 0 /100 WBCS — SIGNIFICANT CHANGE UP (ref 0–0)
PLATELET # BLD AUTO: 353 K/UL — SIGNIFICANT CHANGE UP (ref 150–400)
POTASSIUM SERPL-MCNC: 4.1 MMOL/L — SIGNIFICANT CHANGE UP (ref 3.5–5.3)
POTASSIUM SERPL-SCNC: 4.1 MMOL/L — SIGNIFICANT CHANGE UP (ref 3.5–5.3)
PROT SERPL-MCNC: 7.1 G/DL — SIGNIFICANT CHANGE UP (ref 6–8.3)
RBC # BLD: 4.95 M/UL — SIGNIFICANT CHANGE UP (ref 4.2–5.8)
RBC # FLD: 12.2 % — SIGNIFICANT CHANGE UP (ref 10.3–14.5)
SODIUM SERPL-SCNC: 143 MMOL/L — SIGNIFICANT CHANGE UP (ref 135–145)
WBC # BLD: 7.56 K/UL — SIGNIFICANT CHANGE UP (ref 3.8–10.5)
WBC # FLD AUTO: 7.56 K/UL — SIGNIFICANT CHANGE UP (ref 3.8–10.5)

## 2024-03-25 PROCEDURE — 85610 PROTHROMBIN TIME: CPT

## 2024-03-25 PROCEDURE — 36573 INSJ PICC RS&I 5 YR+: CPT

## 2024-03-25 PROCEDURE — 93005 ELECTROCARDIOGRAM TRACING: CPT

## 2024-03-25 PROCEDURE — 99239 HOSP IP/OBS DSCHRG MGMT >30: CPT | Mod: GC

## 2024-03-25 PROCEDURE — 72129 CT CHEST SPINE W/DYE: CPT | Mod: MC

## 2024-03-25 PROCEDURE — 93306 TTE W/DOPPLER COMPLETE: CPT

## 2024-03-25 PROCEDURE — 72126 CT NECK SPINE W/DYE: CPT | Mod: MC

## 2024-03-25 PROCEDURE — 76937 US GUIDE VASCULAR ACCESS: CPT

## 2024-03-25 PROCEDURE — 87640 STAPH A DNA AMP PROBE: CPT

## 2024-03-25 PROCEDURE — 85025 COMPLETE CBC W/AUTO DIFF WBC: CPT

## 2024-03-25 PROCEDURE — 0225U NFCT DS DNA&RNA 21 SARSCOV2: CPT

## 2024-03-25 PROCEDURE — 99232 SBSQ HOSP IP/OBS MODERATE 35: CPT

## 2024-03-25 PROCEDURE — 36000 PLACE NEEDLE IN VEIN: CPT | Mod: 59

## 2024-03-25 PROCEDURE — 87641 MR-STAPH DNA AMP PROBE: CPT

## 2024-03-25 PROCEDURE — C1751: CPT

## 2024-03-25 PROCEDURE — 74177 CT ABD & PELVIS W/CONTRAST: CPT | Mod: MC

## 2024-03-25 PROCEDURE — 85730 THROMBOPLASTIN TIME PARTIAL: CPT

## 2024-03-25 PROCEDURE — 80053 COMPREHEN METABOLIC PANEL: CPT

## 2024-03-25 PROCEDURE — 86140 C-REACTIVE PROTEIN: CPT

## 2024-03-25 PROCEDURE — 72132 CT LUMBAR SPINE W/DYE: CPT | Mod: MC

## 2024-03-25 PROCEDURE — 99285 EMERGENCY DEPT VISIT HI MDM: CPT | Mod: 25

## 2024-03-25 PROCEDURE — 93312 ECHO TRANSESOPHAGEAL: CPT

## 2024-03-25 PROCEDURE — 96374 THER/PROPH/DIAG INJ IV PUSH: CPT

## 2024-03-25 PROCEDURE — 73701 CT LOWER EXTREMITY W/DYE: CPT | Mod: MC

## 2024-03-25 PROCEDURE — 87040 BLOOD CULTURE FOR BACTERIA: CPT

## 2024-03-25 PROCEDURE — 81003 URINALYSIS AUTO W/O SCOPE: CPT

## 2024-03-25 PROCEDURE — 87086 URINE CULTURE/COLONY COUNT: CPT

## 2024-03-25 PROCEDURE — 76376 3D RENDER W/INTRP POSTPROCES: CPT

## 2024-03-25 PROCEDURE — 93320 DOPPLER ECHO COMPLETE: CPT

## 2024-03-25 PROCEDURE — 85652 RBC SED RATE AUTOMATED: CPT

## 2024-03-25 PROCEDURE — 80048 BASIC METABOLIC PNL TOTAL CA: CPT

## 2024-03-25 PROCEDURE — 87186 SC STD MICRODIL/AGAR DIL: CPT

## 2024-03-25 PROCEDURE — 83605 ASSAY OF LACTIC ACID: CPT

## 2024-03-25 PROCEDURE — 85027 COMPLETE CBC AUTOMATED: CPT

## 2024-03-25 PROCEDURE — 77001 FLUOROGUIDE FOR VEIN DEVICE: CPT | Mod: 26,59

## 2024-03-25 PROCEDURE — 76937 US GUIDE VASCULAR ACCESS: CPT | Mod: 26,59

## 2024-03-25 PROCEDURE — 36415 COLL VENOUS BLD VENIPUNCTURE: CPT

## 2024-03-25 PROCEDURE — 93325 DOPPLER ECHO COLOR FLOW MAPG: CPT

## 2024-03-25 RX ORDER — SODIUM CHLORIDE 9 MG/ML
10 INJECTION INTRAMUSCULAR; INTRAVENOUS; SUBCUTANEOUS
Refills: 0 | Status: DISCONTINUED | OUTPATIENT
Start: 2024-03-25 | End: 2024-03-25

## 2024-03-25 RX ORDER — CHLORHEXIDINE GLUCONATE 213 G/1000ML
1 SOLUTION TOPICAL
Refills: 0 | Status: DISCONTINUED | OUTPATIENT
Start: 2024-03-25 | End: 2024-03-25

## 2024-03-25 RX ORDER — CEFAZOLIN SODIUM 1 G
2 VIAL (EA) INJECTION
Qty: 0 | Refills: 0 | DISCHARGE
Start: 2024-03-25

## 2024-03-25 RX ADMIN — Medication 100 MILLIGRAM(S): at 13:51

## 2024-03-25 RX ADMIN — MUPIROCIN 1 APPLICATION(S): 20 OINTMENT TOPICAL at 06:29

## 2024-03-25 RX ADMIN — MUPIROCIN 1 APPLICATION(S): 20 OINTMENT TOPICAL at 17:40

## 2024-03-25 RX ADMIN — Medication 100 MILLIGRAM(S): at 06:27

## 2024-03-25 RX ADMIN — CHLORHEXIDINE GLUCONATE 1 APPLICATION(S): 213 SOLUTION TOPICAL at 17:40

## 2024-03-25 RX ADMIN — Medication 100 MILLIGRAM(S): at 18:49

## 2024-03-25 NOTE — PROGRESS NOTE ADULT - PROVIDER SPECIALTY LIST ADULT
Cardiology
Infectious Disease
Cardiology
Hospitalist
Infectious Disease
Hospitalist
Infectious Disease
Hospitalist

## 2024-03-25 NOTE — PRE PROCEDURE NOTE - PRE PROCEDURE EVALUATION
Interventional Radiology    HPI: 59y Male with history of bilateral hip replacement was admitted with positive blood cultures.  Blood cultures with MSSA on 3/16, 3/18 and 3/20.  Repeat blood cultures from 3/22 NGTD.  Per ID, Continue Cefazolin 2gm q8  (5/2/24 last day).  Will require IR PICC placement.    Allergies: No Known Allergies    Medications (Abx/Cardiac/Anticoagulation/Blood Products)    ceFAZolin   IVPB: 100 mL/Hr IV Intermittent (03-25 @ 06:27)    Data:    T(C): 36.9  HR: 64  BP: 137/62  RR: 18  SpO2: 94%    Exam  General: No acute distress  Chest: Non labored breathing  Abdomen: Non-distended  Extremities: No swelling, warm    -WBC 7.56 / HgB 14.0 / Hct 42.8 / Plt 353  -Na 143 / Cl 108 / BUN 12 / Glucose 86  -K 4.1 / CO2 27 / Cr 0.74  -ALT 23 / Alk Phos 55 / T.Bili 0.5    Imaging: reviewed    Plan: 59y Male presents for PICC line insertion  -Risks/Benefits/alternatives explained with the patient and/or healthcare proxy and witnessed informed consent obtained.

## 2024-03-25 NOTE — PROCEDURE NOTE - ADDITIONAL PROCEDURE DETAILS
44 cm bard single lumen power picc inserted into patent right basilic vein under sterile conditions and image guidance.  Tip is in the SVC.  PICC can be accessed.

## 2024-03-25 NOTE — PROGRESS NOTE ADULT - REASON FOR ADMISSION
MSSA bacteremia

## 2024-03-25 NOTE — PROGRESS NOTE ADULT - PROBLEM SELECTOR PLAN 1
c/w cefazolin 2 g q8h  - TTE order to evaluate for vegetations- normal EF, no definitive vegetations seen  - JIMMY (3/22/24) negative for vegetations  - CT Cervical/Thoracic/ Lumbar Spine + CT B/L hip w/wo IV contrast ordered, negative   - further w/u awaiting ID recs- Dr. Leger   - Cardio- Dr. Guerrero consulted, appreciate recs  - Blood cultures from 3/20- gram +cocci in cluster , repeat Blood cx NG x 24 hrs   - PICC ordered
c/w cefazolin 2 g q8h  - TTE order to evaluate for vegetations- normal EF, no definitive vegetations seen  - JIMMY (3/22/24) negative for vegetations  - CT Cervical/Thoracic/ Lumbar Spine + CT B/L hip w/wo IV contrast ordered, f/u results  - ID consult placed with Dr Leger  - Cardio- Dr. Guerrero consulted, appreciate recs  - Tylenol for fever  - Blood cultures from 3/20- gram +cocci in cluster , repeat Blood cx ordered
c/w cefazolin 2 g q8h - last day 5/2/24 per ID Dr. Leger  - TTE order to evaluate for vegetations- normal EF, no definitive vegetations seen  - JIMMY (3/22/24) negative for vegetations  - CT Cervical/Thoracic/ Lumbar Spine + CT B/L hip w/wo IV contrast ordered, negative    - Cardio- Dr. Guerrero consulted, appreciate recs  - Blood cultures from 3/20- gram +cocci in cluster , repeat Blood cx NG x 48 hrs   - PICC today, plan for d/c home on IV Cefazolin to complete 5/2/24
c/w cefazolin 2 g q8h  - TTE order to evaluate for vegetations- normal EF, no definitive vegetations seen  - JIMMY (3/22/24) negative for vegetations  - CT Cervical/Thoracic/ Lumbar Spine + CT B/L hip w/wo IV contrast ordered, negative   - further w/u awaiting ID recs- Dr. Leger   - Cardio- Dr. Guerrero consulted, appreciate recs  - Blood cultures from 3/20- gram +cocci in cluster , repeat Blood cx ordered- pending

## 2024-03-25 NOTE — PROGRESS NOTE ADULT - PROBLEM SELECTOR PLAN 2
2/2 coronavirus -NOT COVID-19  -Supportive care, no active issues or complaints
2/2 coronavirus -NOT COVID-19  -Supportive care

## 2024-03-25 NOTE — DISCHARGE NOTE NURSING/CASE MANAGEMENT/SOCIAL WORK - PATIENT PORTAL LINK FT
You can access the FollowMyHealth Patient Portal offered by [facility] by registering at the following website: [email or web address]. By joining [facility]’s FollowMyHealth portal, you will also be able to view your health information using other applications (apps) compatible with our system.

## 2024-03-25 NOTE — PROGRESS NOTE ADULT - ASSESSMENT
60 yo M no PMHx who presents for abnormal labs. Admitted for treatment of MSSA bacteremia.    CHARTING IN PROGRESS 60 yo M no PMHx who presents for abnormal labs. Admitted for treatment of MSSA bacteremia.

## 2024-03-25 NOTE — CASE MANAGEMENT PROGRESS NOTE - NSCMPROGRESSNOTE_GEN_ALL_CORE
Discussed patient on rounds this morning and with MD. Plan for PICC placement today. CM spoke to IR who confirmed they will place the PICC line soon-aware of discharge for today. CM spoke to Dr. Aden and requested her to call prescription for IV antibiotics into [facility]. [facility] has accepted the patient's case for SOC 3/26/24 at 9am. CM spoke to Dr. Leger and made aware of SOC time. Dr. Leger stated the patient can get his evening dose around 7 pm tonight- requested Dr. Leger to put in an order for the bedside RN to administer at 7pm. CM met with the patient at the bedside to discuss the transition plan for home with IV antibiotics for this evening and discussed home IV infusion and SOC for home IV infusion RN. Patient is aware the RN does not come daily. Patient stated that he will be able to administer and has his ex-spouse Tali to assist as needed. Patient declined for CM to contact Tali at this time. Patient verbalized understanding of the transition plan and is in agreement. Bedside RN aware of plan. Discharge notice provided. CM remains available.

## 2024-03-25 NOTE — PROGRESS NOTE ADULT - PROBLEM SELECTOR PLAN 3
- CT abd/pelvis- no acute abdominal pathology, noted 1.8cm hepatic cyst and subcentimeter hepatic lesion  - patient states he was made aware on prior ED visit, will follow up with GI as outpatient

## 2024-03-25 NOTE — PROGRESS NOTE ADULT - SUBJECTIVE AND OBJECTIVE BOX
[facility] Cardiology    CHIEF COMPLAINT: Patient is a 59y old  Male who presents with a chief complaint of MSSA bacteremia (20 Mar 2024 15:18)      Follow Up: [ ] Chest Pain      [ ] Dyspnea     [ ] Palpitations    [ ] Atrial Fibrillation     [ ] Ventricular Dysrhythmia    [ ] Abnormal EKG                      [ ] Abnormal Cardiac Enzymes     [ ] Valvular Disease    HPI:  60 yo pleasant M no PMHx who presents for abnormal labs. Pt had presented to the ED on 3/16 with complaints of fever and abnormal CXR showing consolidation in the R lung. Was discharged from the ED and blood cultures grew MSSA. Attempts to reach to pt were unsuccesful but pt was at PCP today where the MSSA bacteremia was noted and pt was sent to the ED. Symptomatically pt states he is improving, fevers have improved. No arthralgias or myalgias, no palpitations, no N/V, dysuria, no abdominal pain.  (19 Mar 2024 01:29)    PAST MEDICAL & SURGICAL HISTORY:  No pertinent past medical history        MEDICATIONS  (STANDING):  ceFAZolin   IVPB 2000 milliGRAM(s) IV Intermittent every 8 hours  mupirocin 2% Nasal 1 Application(s) Both Nostrils two times a day    MEDICATIONS  (PRN):  acetaminophen     Tablet .. 650 milliGRAM(s) Oral every 6 hours PRN Temp greater or equal to 38C (100.4F), Mild Pain (1 - 3)  aluminum hydroxide/magnesium hydroxide/simethicone Suspension 30 milliLiter(s) Oral every 4 hours PRN Dyspepsia  melatonin 3 milliGRAM(s) Oral at bedtime PRN Insomnia  ondansetron Injectable 4 milliGRAM(s) IV Push every 8 hours PRN Nausea and/or Vomiting    Allergies    No Known Allergies    Intolerances        REVIEW OF SYSTEMS:    CONSTITUTIONAL: No weakness, fevers or chills.   EYES/ENT: No visual changes;    NECK: No pain or stiffness  RESPIRATORY: No cough, wheezing, No shortness of breath  CARDIOVASCULAR: No chest pain or palpitations  GASTROINTESTINAL: No abdominal pain, or hematochezia.  GENITOURINARY: No dysuria orhematuria  NEUROLOGICAL: No numbness or weakness  SKIN: No itching, burning, rashes  All other review of systems is negative unless indicated above    Vital Signs Last 24 Hrs  T(C): 37.1 (21 Mar 2024 04:55), Max: 37.1 (21 Mar 2024 04:55)  T(F): 98.8 (21 Mar 2024 04:55), Max: 98.8 (21 Mar 2024 04:55)  HR: 61 (21 Mar 2024 04:55) (61 - 67)  BP: 134/76 (21 Mar 2024 04:55) (132/73 - 134/76)  BP(mean): --  RR: 18 (21 Mar 2024 04:55) (18 - 18)  SpO2: 92% (21 Mar 2024 04:55) (92% - 97%)    Parameters below as of 21 Mar 2024 04:55  Patient On (Oxygen Delivery Method): room air      I&O's Summary      PHYSICAL EXAM:  Constitutional: NAD  Neurological: Alert, no focal deficits  HEENT: no JVD, EOMI  Cardiovascular: Regular, S1 and S2, no murmur  Pulmonary: breath sounds bilaterally  Gastrointestinal: Bowel Sounds present, soft, nontender  EXT:  no peripheral edema  Skin: No rashes.  Psych:  Mood calm  LABS: All Labs Reviewed:                          14.4   5.45  )-----------( 256      ( 20 Mar 2024 10:30 )             43.3     03-20    143  |  106  |  9   ----------------------------<  111<H>  3.5   |  30  |  0.75    Ca    8.8      20 Mar 2024 10:30    TPro  7.0  /  Alb  3.2<L>  /  TBili  0.5  /  DBili  x   /  AST  18  /  ALT  30  /  AlkPhos  53  03-20          12 Lead ECG:   Ventricular Rate 66 BPM    Atrial Rate 66 BPM    P-R Interval 142 ms    QRS Duration 90 ms    Q-T Interval 390 ms    QTC Calculation(Bazett) 408 ms    P Axis 31 degrees    R Axis 20 degrees    T Axis 23 degrees    Diagnosis Line Normal sinus rhythm  Normal ECG  No previous ECGs available  Confirmed by ELI RUSSO ([id number]) on 3/19/2024 7:16:44 AM (03-19-24 @ 01:08)    Assessment and Recommendation:   · Assessment    60 yo pleasant M no PMHx who presents for abnormal labs. Pt had presented to the ED on 3/16 with complaints of fever and abnormal CXR showing consolidation in the R lung. Was discharged from the ED and blood cultures grew MSSA. Attempts to reach to pt were unsuccesful but pt was at PCP today where the MSSA bacteremia was noted and pt was sent to the ED. Symptomatically pt states he is improving, fevers have improved. No arthralgias or myalgias, no palpitations, no N/V, dysuria, no abdominal pain.      1. bacteremia mssa  No clear source of bacteremia.  Pt has absence of any cardiac history.  He has no chest discomfort, SOB, palpn.    He is active with no problem and puts in 10k steps daily.  Absence of FH for significant cardiac condition.   Absence of murmur.  EKG is normal.  Echo has no valve finding.     will await ID input.    Option of JIMMY was discussed with pt and he understand procedure and would be agreeable.  Will try abd reserve JIMMY spot for tomorrow afternoon 4 pm with Dr JORDY Riggins. PLease keep NPO after midnight    Will follow.       
INTERVAL HPI/OVERNIGHT EVENTS: Patient seen and examined at bedside. States he is feeling well, complains fo slight nasal congestion, but otherwise no complaints. Denies any chest pain, SOB, abd pain, palpitations.    ROS: All other review of systems is negative unless indicated above.    MEDICATIONS  (STANDING):  ceFAZolin   IVPB 2000 milliGRAM(s) IV Intermittent every 8 hours    MEDICATIONS  (PRN):  acetaminophen     Tablet .. 650 milliGRAM(s) Oral every 6 hours PRN Temp greater or equal to 38C (100.4F), Mild Pain (1 - 3)  aluminum hydroxide/magnesium hydroxide/simethicone Suspension 30 milliLiter(s) Oral every 4 hours PRN Dyspepsia  melatonin 3 milliGRAM(s) Oral at bedtime PRN Insomnia  ondansetron Injectable 4 milliGRAM(s) IV Push every 8 hours PRN Nausea and/or Vomiting      Allergies    No Known Allergies    Intolerances              Vital Signs Last 24 Hrs  T(C): 36.9 (20 Mar 2024 11:33), Max: 37.4 (20 Mar 2024 05:17)  T(F): 98.4 (20 Mar 2024 11:33), Max: 99.3 (20 Mar 2024 05:17)  HR: 65 (20 Mar 2024 11:33) (57 - 69)  BP: 135/82 (20 Mar 2024 11:33) (135/82 - 147/84)  BP(mean): --  RR: 18 (20 Mar 2024 11:33) (18 - 18)  SpO2: 93% (20 Mar 2024 11:33) (92% - 93%)    Parameters below as of 20 Mar 2024 11:33  Patient On (Oxygen Delivery Method): room air        03-19 @ 07:01  -  03-20 @ 07:00  --------------------------------------------------------  IN: 1560 mL / OUT: 0 mL / NET: 1560 mL        Physical Exam:  General: laying comfortably in bed, NAD  Neurology: A&Ox3, nonfocal, BAÑUELOS x 4  Respiratory: CTA B/L  CV: RRR, S1S2, no murmurs, rubs or gallops  Abdominal: Soft, NT, ND +BS  Extremities: No edema, + peripheral pulses  Skin: warm, dry, no lesions/scratches noted      LABS:                        14.4   5.45  )-----------( 256      ( 20 Mar 2024 10:30 )             43.3     03-20    143  |  106  |  9   ----------------------------<  111<H>  3.5   |  30  |  0.75    Ca    8.8      20 Mar 2024 10:30    TPro  7.0  /  Alb  3.2<L>  /  TBili  0.5  /  DBili  x   /  AST  18  /  ALT  30  /  AlkPhos  53  03-20    PT/INR - ( 18 Mar 2024 23:10 )   PT: 11.9 sec;   INR: 1.02 ratio         PTT - ( 18 Mar 2024 23:10 )  PTT:29.5 sec  Urinalysis Basic - ( 20 Mar 2024 10:30 )    Color: x / Appearance: x / SG: x / pH: x  Gluc: 111 mg/dL / Ketone: x  / Bili: x / Urobili: x   Blood: x / Protein: x / Nitrite: x   Leuk Esterase: x / RBC: x / WBC x   Sq Epi: x / Non Sq Epi: x / Bacteria: x        RADIOLOGY & ADDITIONAL TESTS:
[facility]  INFECTIOUS DISEASES   [address] [place] [address]  Tel: [phone]     Fax: [phone]  ========================================================  MD Jaquelin Flores Kaushal, MD Cho, Michelle, MD Sunjit, Jaspal, MD  ========================================================    [id number]  URSULA CUNHA     Follow up: MSSA bacteremia     No new complaint, no fever, Blood culture positive again.     PAST MEDICAL & SURGICAL HISTORY:  No pertinent past medical history    Social Hx: No current smoking, EtOH or drugs     FAMILY HISTORY:  Noncontributory     Allergies  No Known Allergies    MEDICATIONS  (STANDING):  ceFAZolin   IVPB 2000 milliGRAM(s) IV Intermittent every 8 hours    MEDICATIONS  (PRN):  acetaminophen     Tablet .. 650 milliGRAM(s) Oral every 6 hours PRN Temp greater or equal to 38C (100.4F), Mild Pain (1 - 3)  aluminum hydroxide/magnesium hydroxide/simethicone Suspension 30 milliLiter(s) Oral every 4 hours PRN Dyspepsia  melatonin 3 milliGRAM(s) Oral at bedtime PRN Insomnia  ondansetron Injectable 4 milliGRAM(s) IV Push every 8 hours PRN Nausea and/or Vomiting    REVIEW OF SYSTEMS:  CONSTITUTIONAL:  No Fever or chills  HEENT:  No diplopia or blurred vision.  No sore throat or runny nose.  CARDIOVASCULAR:  No chest pain   RESPIRATORY:  No cough, shortness of breath, PND or orthopnea.  GASTROINTESTINAL:  No nausea, vomiting or diarrhea.  GENITOURINARY:  No dysuria, frequency or urgency. No Blood in urine  MUSCULOSKELETAL:  no joint aches, no muscle pain  SKIN:  No change in skin, hair or nails.    Physical Exam:  Vital Signs Last 24 Hrs  T(C): 36.9 (20 Mar 2024 11:33), Max: 37.4 (20 Mar 2024 05:17)  T(F): 98.4 (20 Mar 2024 11:33), Max: 99.3 (20 Mar 2024 05:17)  HR: 65 (20 Mar 2024 11:33) (57 - 69)  BP: 135/82 (20 Mar 2024 11:33) (135/82 - 147/84)  BP(mean): --  RR: 18 (20 Mar 2024 11:33) (18 - 18)  SpO2: 93% (20 Mar 2024 11:33) (92% - 93%)  Parameters below as of 20 Mar 2024 11:33  Patient On (Oxygen Delivery Method): room air  GEN: NAD  HEENT: normocephalic and atraumatic. EOMI. PERRL.    NECK: Supple.  No lymphadenopathy   LUNGS: Clear to auscultation.  HEART: Regular rate and rhythm without murmur.  ABDOMEN: Soft, nontender, and nondistended.  Positive bowel sounds.    : No CVA tenderness  EXTREMITIES: Without edema.  NEUROLOGIC: grossly intact.  PSYCHIATRIC: Appropriate affect .  SKIN: No rash     Labs:                        14.4   5.45  )-----------( 256      ( 20 Mar 2024 10:30 )             43.3     03-20    143  |  106  |  9   ----------------------------<  111<H>  3.5   |  30  |  0.75    Ca    8.8      20 Mar 2024 10:30    TPro  7.0  /  Alb  3.2<L>  /  TBili  0.5  /  DBili  x   /  AST  18  /  ALT  30  /  AlkPhos  53  03-20    Culture - Blood (collected 03-18-24 @ 23:10)  Source: .Blood Blood-Peripheral  Gram Stain (03-20-24 @ 06:36):    Growth in aerobic bottle: Gram Positive Cocci in Clusters  Preliminary Report (03-20-24 @ 06:36):    Growth in aerobic bottle: Gram Positive Cocci in Clusters    Culture - Urine (collected 03-18-24 @ 23:10)  Source: Clean Catch Clean Catch (Midstream)  Final Report (03-20-24 @ 07:03):    No growth    Culture - Blood (collected 03-18-24 @ 23:00)  Source: .Blood Blood-Peripheral  Gram Stain (03-20-24 @ 06:36):    Growth in aerobic bottle: Gram Positive Cocci in Clusters  Preliminary Report (03-20-24 @ 06:36):    Growth in aerobic bottle: Gram Positive Cocci in Clusters    Culture - Blood (collected 03-16-24 @ 20:20)  Source: .Blood Blood-Peripheral  Gram Stain (03-17-24 @ 17:29):    Growth in anaerobic bottle: Gram Positive Cocci in Clusters    Growth in aerobic bottle: Gram Positive Cocci in Clusters  Final Report (03-19-24 @ 10:34):    Growth in aerobic and anaerobic bottles: Staphylococcus aureus    See previous culture [id number]    Culture - Blood (collected 03-16-24 @ 20:05)  Source: .Blood Blood-Peripheral  Gram Stain (03-17-24 @ 16:55):    Growth in aerobic bottle: Gram Positive Cocci in Clusters    Growth in anaerobic bottle: Gram Positive Cocci in Clusters  Final Report (03-19-24 @ 10:34):    Growth in aerobic and anaerobic bottles: Staphylococcus aureus    Direct identification is available within approximately 3-5    hours either by Blood Panel Multiplexed PCR or Direct    MALDI-TOF. Details: [email or web address]  Organism: Blood Culture PCR  Staphylococcus aureus (03-19-24 @ 10:34)  Organism: Staphylococcus aureus (03-19-24 @ 10:34)    Sensitivities:      Method Type: HEYDI      -  Ampicillin/Sulbactam: S <=8/4      -  Cefazolin: S <=4      -  Clindamycin: S <=0.25      -  Erythromycin: S <=0.25      -  Gentamicin: S <=1 Should not be used as monotherapy      -  Oxacillin: S 0.5 Oxacillin predicts susceptibility for dicloxacillin, methicillin, and nafcillin      -  Penicillin: R >8      -  Rifampin: S <=1 Should not be used as monotherapy      -  Tetracycline: S <=1      -  Trimethoprim/Sulfamethoxazole: S <=0.5/9.5      -  Vancomycin: S 1  Organism: Blood Culture PCR (03-19-24 @ 10:34)    Sensitivities:      Method Type: PCR      -  Methicillin SENSITIVE Staphylococcus aureus (MSSA): Detec Any isolate of Staphylococcus aureus from a blood culture is NOT considered a contaminant.    WBC Count: 5.45 K/uL (03-20-24 @ 10:30)  WBC Count: 5.77 K/uL (03-19-24 @ 04:36)  WBC Count: 6.10 K/uL (03-18-24 @ 23:10)  WBC Count: 6.91 K/uL (03-16-24 @ 20:17)    Creatinine: 0.75 mg/dL (03-20-24 @ 10:30)  Creatinine: 0.71 mg/dL (03-19-24 @ 04:36)  Creatinine: 0.82 mg/dL (03-18-24 @ 23:10)  Creatinine: 1.00 mg/dL (03-16-24 @ 20:17)    Sedimentation Rate, Erythrocyte: 26 mm/hr (03-20-24 @ 06:18)    SARS-CoV-2: NotDetec (03-19-24 @ 03:58)  SARS-CoV-2: NotDetec (03-16-24 @ 20:17)    All imaging and other data have been reviewed.  < from: CT Abdomen and Pelvis w/ IV Cont (03.19.24 @ 00:45) >  IMPRESSION:  No acute abdominopelvic abnormality.    < from: CT Chest w/ IV Cont (03.16.24 @ 21:52) >  IMPRESSION:  No acute findings detected.    Assessment and Plan:   58 yo man with history of bilateral hip replacement was admitted with positive blood cultures. He had fever for few days, was seen in an urgent care with possible R lung consolidation for which azithromycin given.   He was seen in ED on 3/16 and had labs and Blood cultures that came back positive in both sets.   Currently he is afebrile with no respiratory, GI,  or any other complaint. No skin lesion or rash. No sick contact, no contact sports.     Source of staph bacteremia unclear at this time, but it is a true high grade bacteremia 4/4 bottles positive on 3/16 and repeat ones before starting ABx was positive again. He could have had mild pneumonia? or MSSA colonization in nares causing bacteremia even though not common. Due to prolonged bacteremia and no obvious source need to rule out endocarditis.   At this time no back or hip pain.     # MSSA Bacteremia   # URI with coronavirus     - Blood cultures with MSSA on 3/16 and 3/18  - Repeat blood cultures today   - UC negative   - WBC and Tmax normal, will monitor  - Cefazolin 2gm q8  - TTE normal   - Cardiology consult is done for JIMMY     Will follow.  Discussed with the primary team.     Jean Pierre Leger MD  Division of Infectious Diseases   Please call ID service at [phone] with any question.    50 minutes spent on total encounter assessing patient, examination, chart review, counseling and coordinating care by the attending physician/nurse/care manager.  
[facility]  INFECTIOUS DISEASES   [address] [place] [address]  Tel: [phone]     Fax: [phone]  ========================================================  MD Jaquelin Flores Kaushal, MD Cho, Michelle, MD Sunjit, Jaspal, MD  ========================================================    [id number]  URSULA CUNHA     Follow up: MSSA bacteremia     No new complaint, no fever, Blood culture positive again.     PAST MEDICAL & SURGICAL HISTORY:  No pertinent past medical history    Social Hx: No current smoking, EtOH or drugs     FAMILY HISTORY:  Noncontributory     Allergies  No Known Allergies    MEDICATIONS  (STANDING):  ceFAZolin   IVPB 2000 milliGRAM(s) IV Intermittent every 8 hours    MEDICATIONS  (PRN):  acetaminophen     Tablet .. 650 milliGRAM(s) Oral every 6 hours PRN Temp greater or equal to 38C (100.4F), Mild Pain (1 - 3)  aluminum hydroxide/magnesium hydroxide/simethicone Suspension 30 milliLiter(s) Oral every 4 hours PRN Dyspepsia  melatonin 3 milliGRAM(s) Oral at bedtime PRN Insomnia  ondansetron Injectable 4 milliGRAM(s) IV Push every 8 hours PRN Nausea and/or Vomiting    REVIEW OF SYSTEMS:  CONSTITUTIONAL:  No Fever or chills  HEENT:  No diplopia or blurred vision.  No sore throat or runny nose.  CARDIOVASCULAR:  No chest pain   RESPIRATORY:  No cough, shortness of breath, PND or orthopnea.  GASTROINTESTINAL:  No nausea, vomiting or diarrhea.  GENITOURINARY:  No dysuria, frequency or urgency. No Blood in urine  MUSCULOSKELETAL:  no joint aches, no muscle pain  SKIN:  No change in skin, hair or nails.    Physical Exam:  Vital Signs Last 24 Hrs  T(C): 36.6 (21 Mar 2024 12:03), Max: 37.1 (21 Mar 2024 04:55)  T(F): 97.8 (21 Mar 2024 12:03), Max: 98.8 (21 Mar 2024 04:55)  HR: 65 (21 Mar 2024 12:03) (61 - 67)  BP: 128/71 (21 Mar 2024 12:03) (128/71 - 134/76)  BP(mean): --  RR: 18 (21 Mar 2024 12:03) (18 - 18)  SpO2: 92% (21 Mar 2024 12:03) (92% - 97%)  Parameters below as of 21 Mar 2024 12:03  Patient On (Oxygen Delivery Method): room air  GEN: NAD  HEENT: normocephalic and atraumatic. EOMI. PERRL.    NECK: Supple.  No lymphadenopathy   LUNGS: Clear to auscultation.  HEART: Regular rate and rhythm without murmur.  ABDOMEN: Soft, nontender, and nondistended.  Positive bowel sounds.    : No CVA tenderness  EXTREMITIES: Without edema.  NEUROLOGIC: grossly intact.  PSYCHIATRIC: Appropriate affect .  SKIN: No rash       Labs:                        14.4   5.45  )-----------( 256      ( 20 Mar 2024 10:30 )             43.3     03-20    143  |  106  |  9   ----------------------------<  111<H>  3.5   |  30  |  0.75    Ca    8.8      20 Mar 2024 10:30    TPro  7.0  /  Alb  3.2<L>  /  TBili  0.5  /  DBili  x   /  AST  18  /  ALT  30  /  AlkPhos  53  03-20    Culture - Urine (collected 03-18-24 @ 23:10)  Source: Clean Catch Clean Catch (Midstream)  Final Report (03-20-24 @ 07:03):    No growth    Culture - Blood (collected 03-18-24 @ 23:10)  Source: .Blood Blood-Peripheral  Gram Stain (03-21-24 @ 03:31):    Growth in aerobic bottle: Gram Positive Cocci in Clusters    Growth in anaerobic bottle: Gram Positive Cocci in Clusters  Final Report (03-21-24 @ 03:31):    Growth in aerobic bottle: Staphylococcus aureus    Growth in anaerobic bottle: Gram Positive Cocci in Clusters    See previous culture [id number]    Culture - Blood (collected 03-18-24 @ 23:00)  Source: .Blood Blood-Peripheral  Gram Stain (03-20-24 @ 06:36):    Growth in aerobic bottle: Gram Positive Cocci in Clusters  Final Report (03-20-24 @ 19:49):    Growth in aerobic bottle: Staphylococcus aureus    See previous culture [id number]    Culture - Blood (collected 03-16-24 @ 20:20)  Source: .Blood Blood-Peripheral  Gram Stain (03-17-24 @ 17:29):    Growth in anaerobic bottle: Gram Positive Cocci in Clusters    Growth in aerobic bottle: Gram Positive Cocci in Clusters  Final Report (03-19-24 @ 10:34):    Growth in aerobic and anaerobic bottles: Staphylococcus aureus    See previous culture [id number]    Culture - Blood (collected 03-16-24 @ 20:05)  Source: .Blood Blood-Peripheral  Gram Stain (03-17-24 @ 16:55):    Growth in aerobic bottle: Gram Positive Cocci in Clusters    Growth in anaerobic bottle: Gram Positive Cocci in Clusters  Final Report (03-19-24 @ 10:34):    Growth in aerobic and anaerobic bottles: Staphylococcus aureus    Direct identification is available within approximately 3-5    hours either by Blood Panel Multiplexed PCR or Direct    MALDI-TOF. Details: [email or web address]  Organism: Blood Culture PCR  Staphylococcus aureus (03-19-24 @ 10:34)  Organism: Staphylococcus aureus (03-19-24 @ 10:34)    Sensitivities:      -  Clindamycin: S <=0.25      -  Oxacillin: S 0.5 Oxacillin predicts susceptibility for dicloxacillin, methicillin, and nafcillin      -  Gentamicin: S <=1 Should not be used as monotherapy      -  Cefazolin: S <=4      -  Vancomycin: S 1      -  Tetracycline: S <=1      Method Type: HEYDI      -  Ampicillin/Sulbactam: S <=8/4      -  Penicillin: R >8      -  Rifampin: S <=1 Should not be used as monotherapy      -  Erythromycin: S <=0.25      -  Trimethoprim/Sulfamethoxazole: S <=0.5/9.5  Organism: Blood Culture PCR (03-19-24 @ 10:34)    Sensitivities:      Method Type: PCR      -  Methicillin SENSITIVE Staphylococcus aureus (MSSA): Detec Any isolate of Staphylococcus aureus from a blood culture is NOT considered a contaminant.    WBC Count: 5.45 K/uL (03-20-24 @ 10:30)  WBC Count: 5.77 K/uL (03-19-24 @ 04:36)  WBC Count: 6.10 K/uL (03-18-24 @ 23:10)  WBC Count: 6.91 K/uL (03-16-24 @ 20:17)    Creatinine: 0.75 mg/dL (03-20-24 @ 10:30)  Creatinine: 0.71 mg/dL (03-19-24 @ 04:36)  Creatinine: 0.82 mg/dL (03-18-24 @ 23:10)  Creatinine: 1.00 mg/dL (03-16-24 @ 20:17)    Sedimentation Rate, Erythrocyte: 26 mm/hr (03-20-24 @ 06:18)    SARS-CoV-2: NotDetec (03-19-24 @ 03:58)  SARS-CoV-2: NotDetec (03-16-24 @ 20:17)    All imaging and other data have been reviewed.  < from: CT Abdomen and Pelvis w/ IV Cont (03.19.24 @ 00:45) >  IMPRESSION:  No acute abdominopelvic abnormality.    < from: CT Chest w/ IV Cont (03.16.24 @ 21:52) >  IMPRESSION:  No acute findings detected.    Assessment and Plan:   58 yo man with history of bilateral hip replacement was admitted with positive blood cultures. He had fever for few days, was seen in an urgent care with possible R lung consolidation for which azithromycin given.   He was seen in ED on 3/16 and had labs and Blood cultures that came back positive in both sets.   Currently he is afebrile with no respiratory, GI,  or any other complaint. No skin lesion or rash. No sick contact, no contact sports.     Source of staph bacteremia unclear at this time, but it is a true high grade bacteremia 4/4 bottles positive on 3/16 and repeat ones before starting ABx was positive again. He could have had mild pneumonia? or MSSA colonization in nares causing bacteremia even though not common. Due to prolonged bacteremia and no obvious source need to rule out endocarditis.   At this time no back or hip pain.     # MSSA Bacteremia   # URI with coronavirus     - Blood cultures with MSSA on 3/16 and 3/18  - Repeat blood cultures pending from yesterday   - UC negative   - WBC and Tmax normal  - Cefazolin 2gm q8  - TTE normal   - Cardiology consult is done for JIMMY, possibly will done tomorrow.  - If cultures remain negative and JIMMY negative can discharge with PICC and 2 weeks treatment.      Will follow.    Jean Pierre Leger MD  Division of Infectious Diseases   Please call ID service at [phone] with any question.    50 minutes spent on total encounter assessing patient, examination, chart review, counseling and coordinating care by the attending physician/nurse/care manager.  
[facility]  INFECTIOUS DISEASES   [address] [place] [address]  Tel: [phone]     Fax: [phone]  ========================================================  MD Jaquelin Flores Kaushal, MD Cho, Michelle, MD Sunjit, Jaspal, MD  ========================================================    [id number]  URSULA CUNHA     Follow up: MSSA bacteremia     No new complaint, no fever, Blood culture positive again.   Today had JIMMY negative.     PAST MEDICAL & SURGICAL HISTORY:  No pertinent past medical history    Social Hx: No current smoking, EtOH or drugs     FAMILY HISTORY:  Noncontributory     Allergies  No Known Allergies    MEDICATIONS  (STANDING):  ceFAZolin   IVPB 2000 milliGRAM(s) IV Intermittent every 8 hours    MEDICATIONS  (PRN):  acetaminophen     Tablet .. 650 milliGRAM(s) Oral every 6 hours PRN Temp greater or equal to 38C (100.4F), Mild Pain (1 - 3)  aluminum hydroxide/magnesium hydroxide/simethicone Suspension 30 milliLiter(s) Oral every 4 hours PRN Dyspepsia  melatonin 3 milliGRAM(s) Oral at bedtime PRN Insomnia  ondansetron Injectable 4 milliGRAM(s) IV Push every 8 hours PRN Nausea and/or Vomiting    REVIEW OF SYSTEMS:  CONSTITUTIONAL:  No Fever or chills  HEENT:  No diplopia or blurred vision.  No sore throat or runny nose.  CARDIOVASCULAR:  No chest pain   RESPIRATORY:  No cough, shortness of breath, PND or orthopnea.  GASTROINTESTINAL:  No nausea, vomiting or diarrhea.  GENITOURINARY:  No dysuria, frequency or urgency. No Blood in urine  MUSCULOSKELETAL:  no joint aches, no muscle pain  SKIN:  No change in skin, hair or nails.    Physical Exam:  Vital Signs Last 24 Hrs  T(C): 36.7 (22 Mar 2024 11:42), Max: 37.1 (21 Mar 2024 20:00)  T(F): 98.1 (22 Mar 2024 11:42), Max: 98.7 (21 Mar 2024 20:00)  HR: 70 (22 Mar 2024 11:42) (64 - 78)  BP: 118/73 (22 Mar 2024 11:42) (98/59 - 136/93)  BP(mean): --  RR: 18 (22 Mar 2024 11:42) (14 - 19)  SpO2: 95% (22 Mar 2024 11:42) (93% - 99%)  Parameters below as of 22 Mar 2024 11:42  Patient On (Oxygen Delivery Method): room air  GEN: NAD  HEENT: normocephalic and atraumatic. EOMI. PERRL.    NECK: Supple.  No lymphadenopathy   LUNGS: Clear to auscultation.  HEART: Regular rate and rhythm without murmur.  ABDOMEN: Soft, nontender, and nondistended.  Positive bowel sounds.    : No CVA tenderness  EXTREMITIES: Without edema.  NEUROLOGIC: grossly intact.  PSYCHIATRIC: Appropriate affect .  SKIN: No rash       Labs:                        13.8   7.13  )-----------( 315      ( 22 Mar 2024 05:58 )             42.4     03-22    144  |  107  |  13  ----------------------------<  91  4.6   |  31  |  0.91    Ca    9.1      22 Mar 2024 05:58        Culture - Blood (collected 03-20-24 @ 10:35)  Source: .Blood Blood-Peripheral  Gram Stain (03-22-24 @ 06:29):    Growth in anaerobic bottle: Gram Positive Cocci in Clusters  Preliminary Report (03-22-24 @ 06:29):    Growth in anaerobic bottle: Gram Positive Cocci in Clusters    Culture - Blood (collected 03-20-24 @ 10:30)  Source: .Blood Blood-Peripheral  Gram Stain (03-22-24 @ 00:44):    Growth in anaerobic bottle: Gram Positive Cocci in Clusters  Preliminary Report (03-22-24 @ 00:44):    Growth in anaerobic bottle: Gram Positive Cocci in Clusters    Culture - Blood (collected 03-18-24 @ 23:10)  Source: .Blood Blood-Peripheral  Gram Stain (03-21-24 @ 03:31):    Growth in aerobic bottle: Gram Positive Cocci in Clusters    Growth in anaerobic bottle: Gram Positive Cocci in Clusters  Final Report (03-21-24 @ 18:17):    Growth in aerobic and anaerobic bottles: Staphylococcus aureus    See previous culture [id number]    Culture - Urine (collected 03-18-24 @ 23:10)  Source: Clean Catch Clean Catch (Midstream)  Final Report (03-20-24 @ 07:03):    No growth    Culture - Blood (collected 03-18-24 @ 23:00)  Source: .Blood Blood-Peripheral  Gram Stain (03-20-24 @ 06:36):    Growth in aerobic bottle: Gram Positive Cocci in Clusters  Final Report (03-20-24 @ 19:49):    Growth in aerobic bottle: Staphylococcus aureus    See previous culture [id number]    Culture - Blood (collected 03-16-24 @ 20:20)  Source: .Blood Blood-Peripheral  Gram Stain (03-17-24 @ 17:29):    Growth in anaerobic bottle: Gram Positive Cocci in Clusters    Growth in aerobic bottle: Gram Positive Cocci in Clusters  Final Report (03-19-24 @ 10:34):    Growth in aerobic and anaerobic bottles: Staphylococcus aureus    See previous culture [id number]    Culture - Blood (collected 03-16-24 @ 20:05)  Source: .Blood Blood-Peripheral  Gram Stain (03-17-24 @ 16:55):    Growth in aerobic bottle: Gram Positive Cocci in Clusters    Growth in anaerobic bottle: Gram Positive Cocci in Clusters  Final Report (03-19-24 @ 10:34):    Growth in aerobic and anaerobic bottles: Staphylococcus aureus    Direct identification is available within approximately 3-5    hours either by Blood Panel Multiplexed PCR or Direct    MALDI-TOF. Details: [email or web address]  Organism: Blood Culture PCR  Staphylococcus aureus (03-19-24 @ 10:34)  Organism: Staphylococcus aureus (03-19-24 @ 10:34)    Sensitivities:      Method Type: HEYDI      -  Ampicillin/Sulbactam: S <=8/4      -  Cefazolin: S <=4      -  Clindamycin: S <=0.25      -  Erythromycin: S <=0.25      -  Gentamicin: S <=1 Should not be used as monotherapy      -  Oxacillin: S 0.5 Oxacillin predicts susceptibility for dicloxacillin, methicillin, and nafcillin      -  Penicillin: R >8      -  Rifampin: S <=1 Should not be used as monotherapy      -  Tetracycline: S <=1      -  Trimethoprim/Sulfamethoxazole: S <=0.5/9.5      -  Vancomycin: S 1  Organism: Blood Culture PCR (03-19-24 @ 10:34)    Sensitivities:      Method Type: PCR      -  Methicillin SENSITIVE Staphylococcus aureus (MSSA): Detec Any isolate of Staphylococcus aureus from a blood culture is NOT considered a contaminant.    WBC Count: 7.13 K/uL (03-22-24 @ 05:58)  WBC Count: 5.45 K/uL (03-20-24 @ 10:30)  WBC Count: 5.77 K/uL (03-19-24 @ 04:36)  WBC Count: 6.10 K/uL (03-18-24 @ 23:10)    Creatinine: 0.91 mg/dL (03-22-24 @ 05:58)  Creatinine: 0.75 mg/dL (03-20-24 @ 10:30)  Creatinine: 0.71 mg/dL (03-19-24 @ 04:36)  Creatinine: 0.82 mg/dL (03-18-24 @ 23:10)    Sedimentation Rate, Erythrocyte: 26 mm/hr (03-20-24 @ 06:18)    SARS-CoV-2: NotDetec (03-19-24 @ 03:58)  SARS-CoV-2: NotDetec (03-16-24 @ 20:17)    All imaging and other data have been reviewed.  < from: CT Abdomen and Pelvis w/ IV Cont (03.19.24 @ 00:45) >  IMPRESSION:  No acute abdominopelvic abnormality.    < from: CT Chest w/ IV Cont (03.16.24 @ 21:52) >  IMPRESSION:  No acute findings detected.    Assessment and Plan:   60 yo man with history of bilateral hip replacement was admitted with positive blood cultures. He had fever for few days, was seen in an urgent care with possible R lung consolidation for which azithromycin given.   He was seen in ED on 3/16 and had labs and Blood cultures that came back positive in both sets.   Currently he is afebrile with no respiratory, GI,  or any other complaint. No skin lesion or rash. No sick contact, no contact sports.   JIMMY 3/22 negative     Source of staph bacteremia unclear at this time, Blood culures still positive, today had a negative JIMMY.    At this time no back or hip pain but since spine is the common area for staph infection and also he has hip replacement will do CT to rule out any abnormal findings.     # MSSA Bacteremia   # URI with coronavirus     - Blood cultures with MSSA on 3/16, 3/18 and 3/20  - Repeat blood cultures are done today will follow   - UC negative   - WBC and Tmax normal  - Continue Cefazolin 2gm q8  - TTE and JIMMY with no vegetations   - Will do Spine CT and also Hips (had replacement) today     Will follow.    Jean Pierre Leger MD  Division of Infectious Diseases   Please call ID service at [phone] with any question.    50 minutes spent on total encounter assessing patient, examination, chart review, counseling and coordinating care by the attending physician/nurse/care manager.  
[facility]  INFECTIOUS DISEASES   [address] [place] [address]  Tel: [phone]     Fax: [phone]  ========================================================  MD Jaquelin Flores Kaushal, MD Cho, Michelle, MD Sunjit, Jaspal, MD  ========================================================    [id number]  URSULA CUNHA     Follow up: MSSA bacteremia     No new complaint, no fever, Blood culture negative  No joint pain or swelling, no back pain.     PAST MEDICAL & SURGICAL HISTORY:  No pertinent past medical history    Social Hx: No current smoking, EtOH or drugs     FAMILY HISTORY:  Noncontributory     Allergies  No Known Allergies    MEDICATIONS  (STANDING):  ceFAZolin   IVPB 2000 milliGRAM(s) IV Intermittent every 8 hours    MEDICATIONS  (PRN):  acetaminophen     Tablet .. 650 milliGRAM(s) Oral every 6 hours PRN Temp greater or equal to 38C (100.4F), Mild Pain (1 - 3)  aluminum hydroxide/magnesium hydroxide/simethicone Suspension 30 milliLiter(s) Oral every 4 hours PRN Dyspepsia  melatonin 3 milliGRAM(s) Oral at bedtime PRN Insomnia  ondansetron Injectable 4 milliGRAM(s) IV Push every 8 hours PRN Nausea and/or Vomiting    REVIEW OF SYSTEMS:  CONSTITUTIONAL:  No Fever or chills  HEENT:  No diplopia or blurred vision.  No sore throat or runny nose.  CARDIOVASCULAR:  No chest pain   RESPIRATORY:  No cough, shortness of breath, PND or orthopnea.  GASTROINTESTINAL:  No nausea, vomiting or diarrhea.  GENITOURINARY:  No dysuria, frequency or urgency. No Blood in urine  MUSCULOSKELETAL:  no joint aches, no muscle pain  SKIN:  No change in skin, hair or nails.    Physical Exam:  Vital Signs Last 24 Hrs  T(C): 36.5 (24 Mar 2024 05:36), Max: 36.8 (23 Mar 2024 20:11)  T(F): 97.7 (24 Mar 2024 05:36), Max: 98.2 (23 Mar 2024 20:11)  HR: 76 (24 Mar 2024 05:36) (59 - 76)  BP: 106/59 (24 Mar 2024 10:51) (106/59 - 159/77)  BP(mean): --  RR: 18 (24 Mar 2024 05:36) (16 - 18)  SpO2: 95% (24 Mar 2024 05:36) (93% - 95%)  Parameters below as of 24 Mar 2024 05:36  Patient On (Oxygen Delivery Method): room air  GEN: NAD  HEENT: normocephalic and atraumatic. EOMI. PERRL.    NECK: Supple.  No lymphadenopathy   LUNGS: Clear to auscultation.  HEART: Regular rate and rhythm without murmur.  ABDOMEN: Soft, nontender, and nondistended.  Positive bowel sounds.    : No CVA tenderness  EXTREMITIES: Without edema.  NEUROLOGIC: grossly intact.  PSYCHIATRIC: Appropriate affect .  SKIN: No rash       Labs:                        13.6   6.36  )-----------( 336      ( 24 Mar 2024 08:01 )             41.1     03-24    142  |  106  |  10  ----------------------------<  98  4.5   |  30  |  0.88    Ca    8.9      24 Mar 2024 08:01    TPro  6.8  /  Alb  3.1<L>  /  TBili  0.4  /  DBili  x   /  AST  19  /  ALT  23  /  AlkPhos  56  03-24    Culture - Blood (collected 03-22-24 @ 09:40)  Source: .Blood Blood-Peripheral  Preliminary Report (03-23-24 @ 15:11):    No growth at 24 hours    Culture - Blood (collected 03-22-24 @ 09:10)  Source: .Blood Blood-Peripheral  Preliminary Report (03-23-24 @ 15:11):    No growth at 24 hours    Culture - Blood (collected 03-20-24 @ 10:35)  Source: .Blood Blood-Peripheral  Gram Stain (03-22-24 @ 06:29):    Growth in anaerobic bottle: Gram Positive Cocci in Clusters  Final Report (03-23-24 @ 13:12):    Growth in anaerobic bottle: Staphylococcus aureus    See previous culture [id number]    Culture - Blood (collected 03-20-24 @ 10:30)  Source: .Blood Blood-Peripheral  Gram Stain (03-22-24 @ 00:44):    Growth in anaerobic bottle: Gram Positive Cocci in Clusters  Final Report (03-23-24 @ 10:40):    Growth in anaerobic bottle: Staphylococcus aureus  Organism: Staphylococcus aureus (03-23-24 @ 10:40)  Organism: Staphylococcus aureus (03-23-24 @ 10:40)    Sensitivities:      Method Type: HEYDI      -  Ampicillin/Sulbactam: S <=8/4      -  Cefazolin: S <=4      -  Clindamycin: S <=0.25      -  Erythromycin: S <=0.25      -  Gentamicin: S <=1 Should not be used as monotherapy      -  Oxacillin: S 0.5 Oxacillin predicts susceptibility for dicloxacillin, methicillin, and nafcillin      -  Penicillin: R >8      -  Rifampin: S <=1 Should not be used as monotherapy      -  Tetracycline: S <=1      -  Trimethoprim/Sulfamethoxazole: S <=0.5/9.5      -  Vancomycin: S 1    Culture - Blood (collected 03-18-24 @ 23:10)  Source: .Blood Blood-Peripheral  Gram Stain (03-21-24 @ 03:31):    Growth in aerobic bottle: Gram Positive Cocci in Clusters    Growth in anaerobic bottle: Gram Positive Cocci in Clusters  Final Report (03-21-24 @ 18:17):    Growth in aerobic and anaerobic bottles: Staphylococcus aureus    See previous culture [id number]    Culture - Urine (collected 03-18-24 @ 23:10)  Source: Clean Catch Clean Catch (Midstream)  Final Report (03-20-24 @ 07:03):    No growth    Culture - Blood (collected 03-18-24 @ 23:00)  Source: .Blood Blood-Peripheral  Gram Stain (03-20-24 @ 06:36):    Growth in aerobic bottle: Gram Positive Cocci in Clusters  Final Report (03-20-24 @ 19:49):    Growth in aerobic bottle: Staphylococcus aureus    See previous culture [id number]    Culture - Blood (collected 03-16-24 @ 20:20)  Source: .Blood Blood-Peripheral  Gram Stain (03-17-24 @ 17:29):    Growth in anaerobic bottle: Gram Positive Cocci in Clusters    Growth in aerobic bottle: Gram Positive Cocci in Clusters  Final Report (03-19-24 @ 10:34):    Growth in aerobic and anaerobic bottles: Staphylococcus aureus    See previous culture [id number]    Culture - Blood (collected 03-16-24 @ 20:05)  Source: .Blood Blood-Peripheral  Gram Stain (03-17-24 @ 16:55):    Growth in aerobic bottle: Gram Positive Cocci in Clusters    Growth in anaerobic bottle: Gram Positive Cocci in Clusters  Final Report (03-19-24 @ 10:34):    Growth in aerobic and anaerobic bottles: Staphylococcus aureus    Direct identification is available within approximately 3-5    hours either by Blood Panel Multiplexed PCR or Direct    MALDI-TOF. Details: [email or web address]  Organism: Blood Culture PCR  Staphylococcus aureus (03-19-24 @ 10:34)  Organism: Staphylococcus aureus (03-19-24 @ 10:34)    Sensitivities:      Method Type: HEYDI      -  Ampicillin/Sulbactam: S <=8/4      -  Cefazolin: S <=4      -  Clindamycin: S <=0.25      -  Erythromycin: S <=0.25      -  Gentamicin: S <=1 Should not be used as monotherapy      -  Oxacillin: S 0.5 Oxacillin predicts susceptibility for dicloxacillin, methicillin, and nafcillin      -  Penicillin: R >8      -  Rifampin: S <=1 Should not be used as monotherapy      -  Tetracycline: S <=1      -  Trimethoprim/Sulfamethoxazole: S <=0.5/9.5      -  Vancomycin: S 1  Organism: Blood Culture PCR (03-19-24 @ 10:34)    Sensitivities:      Method Type: PCR      -  Methicillin SENSITIVE Staphylococcus aureus (MSSA): Detec Any isolate of Staphylococcus aureus from a blood culture is NOT considered a contaminant.    WBC Count: 6.36 K/uL (03-24-24 @ 08:01)  WBC Count: 6.31 K/uL (03-23-24 @ 07:32)  WBC Count: 7.13 K/uL (03-22-24 @ 05:58)  WBC Count: 5.45 K/uL (03-20-24 @ 10:30)    Creatinine: 0.88 mg/dL (03-24-24 @ 08:01)  Creatinine: 0.78 mg/dL (03-23-24 @ 07:32)  Creatinine: 0.91 mg/dL (03-22-24 @ 05:58)  Creatinine: 0.75 mg/dL (03-20-24 @ 10:30)    Sedimentation Rate, Erythrocyte: 26 mm/hr (03-20-24 @ 06:18)    SARS-CoV-2: NotDetec (03-19-24 @ 03:58)  SARS-CoV-2: NotDetec (03-16-24 @ 20:17)    All imaging and other data have been reviewed.  < from: CT Abdomen and Pelvis w/ IV Cont (03.19.24 @ 00:45) >  IMPRESSION:  No acute abdominopelvic abnormality.    < from: CT Chest w/ IV Cont (03.16.24 @ 21:52) >  IMPRESSION:  No acute findings detected.    < from: CT Hip w/ IV Cont, Bilateral (03.22.24 @ 12:36) >  IMPRESSION:  Bilateral hip arthroplasties without periprosthetic lucency noted.  No CT evidence for osteomyelitis. No drainable fluid collection.    < from: CT Lumbar Spine w/ IV Cont (03.22.24 @ 12:27) >  IMPRESSION:  No evidence of an acute thoracic or lumbar spine fracture.  No suspicious endplate erosion or destruction.  No paraspinal collection.    Assessment and Plan:   60 yo man with history of bilateral hip replacement was admitted with positive blood cultures. He had fever for few days, was seen in an urgent care with possible R lung consolidation for which azithromycin given.   He was seen in ED on 3/16 and had labs and Blood cultures that came back positive in both sets.   Currently he is afebrile with no respiratory, GI,  or any other complaint. No skin lesion or rash. No sick contact, no contact sports.   JIMMY 3/22 negative     Source of staph bacteremia unclear at this time, Blood cultures still positive, today had a negative JIMMY.    At this time no back or hip pain but since spine is the common area for staph infection and also he has hip replacement CT were done, not suspecting septic arthritis, OM or discitis.     # MSSA Bacteremia   # URI with coronavirus     - Blood cultures with MSSA on 3/16, 3/18 and 3/20  - Repeat blood cultures from 3/22 NGTD   - UC negative   - WBC and Tmax normal  - Continue Cefazolin 2gm q8  (5/2/24 last day)  - TTE and JIMMY with no vegetations   - Spine and Hips CT negative   - PICC line tomorrow   - Will treat for 6 weeks IV cefazolin due to unknown source and prolonged bacteremia     Will follow.    Jean Pierre Leger MD  Division of Infectious Diseases   Please call ID service at [phone] with any question.    50 minutes spent on total encounter assessing patient, examination, chart review, counseling and coordinating care by the attending physician/nurse/care manager.  
Patient is a 59y old  Male who presents with a chief complaint of MSSA bacteremia (23 Mar 2024 11:23)      Subjective:  INTERVAL HPI/OVERNIGHT EVENTS: Patient seen and examined at bedside. No overnight events occurred. Patient feels well. Denies all     MEDICATIONS  (STANDING):  ceFAZolin   IVPB 2000 milliGRAM(s) IV Intermittent every 8 hours  dextrose 5% + sodium chloride 0.45%. 1000 milliLiter(s) (50 mL/Hr) IV Continuous <Continuous>  mupirocin 2% Nasal 1 Application(s) Both Nostrils two times a day    MEDICATIONS  (PRN):  acetaminophen     Tablet .. 650 milliGRAM(s) Oral every 6 hours PRN Temp greater or equal to 38C (100.4F), Mild Pain (1 - 3)  aluminum hydroxide/magnesium hydroxide/simethicone Suspension 30 milliLiter(s) Oral every 4 hours PRN Dyspepsia  melatonin 3 milliGRAM(s) Oral at bedtime PRN Insomnia  ondansetron Injectable 4 milliGRAM(s) IV Push every 8 hours PRN Nausea and/or Vomiting      Allergies    No Known Allergies    Intolerances        REVIEW OF SYSTEMS:  CONSTITUTIONAL: No fever or chills  HEENT:  No headache, no sore throat  RESPIRATORY: No cough, wheezing, or shortness of breath  CARDIOVASCULAR: No chest pain, palpitations  GASTROINTESTINAL: No abd pain, nausea, vomiting, or diarrhea  GENITOURINARY: No dysuria, frequency, or hematuria  NEUROLOGICAL: no focal weakness or dizziness  MUSCULOSKELETAL: no myalgias     Objective:  Vital Signs Last 24 Hrs  T(C): 36.5 (24 Mar 2024 05:36), Max: 36.8 (23 Mar 2024 20:11)  T(F): 97.7 (24 Mar 2024 05:36), Max: 98.2 (23 Mar 2024 20:11)  HR: 76 (24 Mar 2024 05:36) (59 - 76)  BP: 159/77 (24 Mar 2024 05:36) (116/80 - 159/77)  BP(mean): --  RR: 18 (24 Mar 2024 05:36) (16 - 18)  SpO2: 95% (24 Mar 2024 05:36) (93% - 95%)    Parameters below as of 24 Mar 2024 05:36  Patient On (Oxygen Delivery Method): room air        GENERAL: NAD, lying in bed comfortably  HEAD:  Atraumatic, Normocephalic  EYES: EOMI, PERRLA, conjunctiva and sclera clear  ENT: Moist mucous membranes  NECK: Supple, No JVD  CHEST/LUNG: Clear to auscultation bilaterally; No rales, rhonchi, wheezing, or rubs. Unlabored respirations  HEART: Regular rate and rhythm; No murmurs, rubs, or gallops  ABDOMEN: Bowel sounds present; Soft, Nontender, Nondistended.   EXTREMITIES:  2+ Peripheral Pulses, brisk capillary refill. No clubbing, cyanosis, or edema  NERVOUS SYSTEM:  Alert & Oriented X3, speech clear. No deficits   MSK: FROM all 4 extremities, full and equal strength  SKIN: warm, dry    LABS:                        13.6   6.36  )-----------( 336      ( 24 Mar 2024 08:01 )             41.1     CBC Full  -  ( 24 Mar 2024 08:01 )  WBC Count : 6.36 K/uL  Hemoglobin : 13.6 g/dL  Hematocrit : 41.1 %  Platelet Count - Automated : 336 K/uL  Mean Cell Volume : 86.7 fl  Mean Cell Hemoglobin : 28.7 pg  Mean Cell Hemoglobin Concentration : 33.1 gm/dL  Auto Neutrophil # : 3.76 K/uL  Auto Lymphocyte # : 1.84 K/uL  Auto Monocyte # : 0.52 K/uL  Auto Eosinophil # : 0.17 K/uL  Auto Basophil # : 0.05 K/uL  Auto Neutrophil % : 59.1 %  Auto Lymphocyte % : 28.9 %  Auto Monocyte % : 8.2 %  Auto Eosinophil % : 2.7 %  Auto Basophil % : 0.8 %    24 Mar 2024 08:01    142    |  106    |  10     ----------------------------<  98     4.5     |  30     |  0.88     Ca    8.9        24 Mar 2024 08:01    TPro  6.8    /  Alb  3.1    /  TBili  0.4    /  DBili  x      /  AST  19     /  ALT  23     /  AlkPhos  56     24 Mar 2024 08:01      Urinalysis Basic - ( 24 Mar 2024 08:01 )    Color: x / Appearance: x / SG: x / pH: x  Gluc: 98 mg/dL / Ketone: x  / Bili: x / Urobili: x   Blood: x / Protein: x / Nitrite: x   Leuk Esterase: x / RBC: x / WBC x   Sq Epi: x / Non Sq Epi: x / Bacteria: x      CAPILLARY BLOOD GLUCOSE            Culture - Blood (collected 03-22-24 @ 09:40)  Source: .Blood Blood-Peripheral  Preliminary Report (03-23-24 @ 15:11):    No growth at 24 hours    Culture - Blood (collected 03-22-24 @ 09:10)  Source: .Blood Blood-Peripheral  Preliminary Report (03-23-24 @ 15:11):    No growth at 24 hours    Culture - Blood (collected 03-20-24 @ 10:35)  Source: .Blood Blood-Peripheral  Gram Stain (03-22-24 @ 06:29):    Growth in anaerobic bottle: Gram Positive Cocci in Clusters  Final Report (03-23-24 @ 13:12):    Growth in anaerobic bottle: Staphylococcus aureus    See previous culture [id number]    Culture - Blood (collected 03-20-24 @ 10:30)  Source: .Blood Blood-Peripheral  Gram Stain (03-22-24 @ 00:44):    Growth in anaerobic bottle: Gram Positive Cocci in Clusters  Final Report (03-23-24 @ 10:40):    Growth in anaerobic bottle: Staphylococcus aureus  Organism: Staphylococcus aureus (03-23-24 @ 10:40)  Organism: Staphylococcus aureus (03-23-24 @ 10:40)      Method Type: HEYDI      -  Ampicillin/Sulbactam: S <=8/4      -  Cefazolin: S <=4      -  Clindamycin: S <=0.25      -  Erythromycin: S <=0.25      -  Gentamicin: S <=1 Should not be used as monotherapy      -  Oxacillin: S 0.5 Oxacillin predicts susceptibility for dicloxacillin, methicillin, and nafcillin      -  Penicillin: R >8      -  Rifampin: S <=1 Should not be used as monotherapy      -  Tetracycline: S <=1      -  Trimethoprim/Sulfamethoxazole: S <=0.5/9.5      -  Vancomycin: S 1    Culture - Blood (collected 03-18-24 @ 23:10)  Source: .Blood Blood-Peripheral  Gram Stain (03-21-24 @ 03:31):    Growth in aerobic bottle: Gram Positive Cocci in Clusters    Growth in anaerobic bottle: Gram Positive Cocci in Clusters  Final Report (03-21-24 @ 18:17):    Growth in aerobic and anaerobic bottles: Staphylococcus aureus    See previous culture [id number]    Culture - Urine (collected 03-18-24 @ 23:10)  Source: Clean Catch Clean Catch (Midstream)  Final Report (03-20-24 @ 07:03):    No growth    Culture - Blood (collected 03-18-24 @ 23:00)  Source: .Blood Blood-Peripheral  Gram Stain (03-20-24 @ 06:36):    Growth in aerobic bottle: Gram Positive Cocci in Clusters  Final Report (03-20-24 @ 19:49):    Growth in aerobic bottle: Staphylococcus aureus    See previous culture [id number]        RADIOLOGY & ADDITIONAL TESTS:    Personally reviewed.     Consultant(s) Notes Reviewed:  [x] YES  [ ] NO    
[facility] Cardiology    CHIEF COMPLAINT: Patient is a 59y old  Male who presents with a chief complaint of MSSA bacteremia (22 Mar 2024 08:00)      Follow Up: [ ] Chest Pain      [ ] Dyspnea     [ ] Palpitations    [ ] Atrial Fibrillation     [ ] Ventricular Dysrhythmia    [ ] Abnormal EKG                      [ ] Abnormal Cardiac Enzymes     [ ] Valvular Disease    HPI:  58 yo pleasant M no PMHx who presents for abnormal labs. Pt had presented to the ED on 3/16 with complaints of fever and abnormal CXR showing consolidation in the R lung. Was discharged from the ED and blood cultures grew MSSA. Attempts to reach to pt were unsuccesful but pt was at PCP today where the MSSA bacteremia was noted and pt was sent to the ED. Symptomatically pt states he is improving, fevers have improved. No arthralgias or myalgias, no palpitations, no N/V, dysuria, no abdominal pain.  (19 Mar 2024 01:29)    PAST MEDICAL & SURGICAL HISTORY:  No pertinent past medical history        MEDICATIONS  (STANDING):  ceFAZolin   IVPB 2000 milliGRAM(s) IV Intermittent every 8 hours  dextrose 5% + sodium chloride 0.45%. 1000 milliLiter(s) (50 mL/Hr) IV Continuous <Continuous>  mupirocin 2% Nasal 1 Application(s) Both Nostrils two times a day    MEDICATIONS  (PRN):  acetaminophen     Tablet .. 650 milliGRAM(s) Oral every 6 hours PRN Temp greater or equal to 38C (100.4F), Mild Pain (1 - 3)  aluminum hydroxide/magnesium hydroxide/simethicone Suspension 30 milliLiter(s) Oral every 4 hours PRN Dyspepsia  melatonin 3 milliGRAM(s) Oral at bedtime PRN Insomnia  ondansetron Injectable 4 milliGRAM(s) IV Push every 8 hours PRN Nausea and/or Vomiting    Allergies    No Known Allergies    Intolerances        REVIEW OF SYSTEMS:    CONSTITUTIONAL: No weakness, fevers or chills.   EYES/ENT: No visual changes;    NECK: No pain or stiffness  RESPIRATORY: No cough, wheezing, No shortness of breath  CARDIOVASCULAR: No chest pain or palpitations  GASTROINTESTINAL: No abdominal pain, or hematochezia.  GENITOURINARY: No dysuria orhematuria  NEUROLOGICAL: No numbness or weakness  SKIN: No itching, burning, rashes  All other review of systems is negative unless indicated above    Vital Signs Last 24 Hrs  T(C): 36.4 (22 Mar 2024 05:31), Max: 37.1 (21 Mar 2024 20:00)  T(F): 97.5 (22 Mar 2024 05:31), Max: 98.7 (21 Mar 2024 20:00)  HR: 67 (22 Mar 2024 09:49) (64 - 78)  BP: 136/93 (22 Mar 2024 09:49) (98/59 - 136/93)  BP(mean): --  RR: 14 (22 Mar 2024 09:49) (14 - 19)  SpO2: 95% (22 Mar 2024 09:49) (92% - 99%)    Parameters below as of 22 Mar 2024 09:49  Patient On (Oxygen Delivery Method): room air      I&O's Summary    PHYSICAL EXAM:  Constitutional: NAD  Neurological: Alert, no focal deficits  HEENT: no JVD, EOMI  Cardiovascular: Regular, S1 and S2, no murmur  Pulmonary: breath sounds bilaterally  Gastrointestinal: Bowel Sounds present, soft, nontender  EXT:  no peripheral edema  Skin: No rashes.  Psych:  Mood calm  LABS: All Labs Reviewed:                        13.8   7.13  )-----------( 315      ( 22 Mar 2024 05:58 )             42.4     03-22    144  |  107  |  13  ----------------------------<  91  4.6   |  31  |  0.91    Ca    9.1      22 Mar 2024 05:58    TPro  7.0  /  Alb  3.2<L>  /  TBili  0.5  /  DBili  x   /  AST  18  /  ALT  30  /  AlkPhos  53  03-20    Assessment and Recommendation:   · Assessment    58 yo pleasant M no PMHx who presents for abnormal labs. Pt had presented to the ED on 3/16 with complaints of fever and abnormal CXR showing consolidation in the R lung. Was discharged from the ED and blood cultures grew MSSA. Attempts to reach to pt were unsuccesful but pt was at PCP today where the MSSA bacteremia was noted and pt was sent to the ED. Symptomatically pt states he is improving, fevers have improved. No arthralgias or myalgias, no palpitations, no N/V, dysuria, no abdominal pain.      1. bacteremia mssa  No clear source of bacteremia.  Pt has absence of any cardiac history.  He has no chest discomfort, SOB, palpn.    He is active with no problem and puts in 10k steps daily.  Absence of FH for significant cardiac condition.   Absence of murmur.  EKG is normal.  Echo has no valve finding.     s/p JIMMY this am with Dr JORDY Riggins. Pre-evangelista; neg for veg  Will follow prn  call if needed  F/U ID RECS re ABX                
[facility]  INFECTIOUS DISEASES   [address] [place] [address]  Tel: [phone]     Fax: [phone]  ========================================================  MD Jaquelin Flores Kaushal, MD Cho, Michelle, MD Sunjit, Jaspal, MD  ========================================================    [id number]  URSULA CUNHA     Follow up: MSSA bacteremia     No new complaint, no fever, Blood culture negative  No joint pain or swelling, no back pain.     PAST MEDICAL & SURGICAL HISTORY:  No pertinent past medical history    Social Hx: No current smoking, EtOH or drugs     FAMILY HISTORY:  Noncontributory     Allergies  No Known Allergies    MEDICATIONS  (STANDING):  ceFAZolin   IVPB 2000 milliGRAM(s) IV Intermittent every 8 hours    MEDICATIONS  (PRN):  acetaminophen     Tablet .. 650 milliGRAM(s) Oral every 6 hours PRN Temp greater or equal to 38C (100.4F), Mild Pain (1 - 3)  aluminum hydroxide/magnesium hydroxide/simethicone Suspension 30 milliLiter(s) Oral every 4 hours PRN Dyspepsia  melatonin 3 milliGRAM(s) Oral at bedtime PRN Insomnia  ondansetron Injectable 4 milliGRAM(s) IV Push every 8 hours PRN Nausea and/or Vomiting    REVIEW OF SYSTEMS:  CONSTITUTIONAL:  No Fever or chills  HEENT:  No diplopia or blurred vision.  No sore throat or runny nose.  CARDIOVASCULAR:  No chest pain   RESPIRATORY:  No cough, shortness of breath, PND or orthopnea.  GASTROINTESTINAL:  No nausea, vomiting or diarrhea.  GENITOURINARY:  No dysuria, frequency or urgency. No Blood in urine  MUSCULOSKELETAL:  no joint aches, no muscle pain  SKIN:  No change in skin, hair or nails.    Physical Exam:  Vital Signs Last 24 Hrs  T(C): 36.9 (25 Mar 2024 12:25), Max: 36.9 (25 Mar 2024 05:34)  T(F): 98.5 (25 Mar 2024 12:25), Max: 98.5 (25 Mar 2024 12:25)  HR: 69 (25 Mar 2024 12:25) (61 - 74)  BP: 129/73 (25 Mar 2024 12:25) (126/88 - 138/75)  BP(mean): --  RR: 18 (25 Mar 2024 12:25) (18 - 18)  SpO2: 96% (25 Mar 2024 12:25) (93% - 97%)  Parameters below as of 25 Mar 2024 12:25  Patient On (Oxygen Delivery Method): room air  GEN: NAD  HEENT: normocephalic and atraumatic. EOMI. PERRL.    NECK: Supple.  No lymphadenopathy   LUNGS: Clear to auscultation.  HEART: Regular rate and rhythm without murmur.  ABDOMEN: Soft, nontender, and nondistended.  Positive bowel sounds.    : No CVA tenderness  EXTREMITIES: Without edema.  NEUROLOGIC: grossly intact.  PSYCHIATRIC: Appropriate affect .  SKIN: No rash       Labs:                        14.0   7.56  )-----------( 353      ( 25 Mar 2024 07:23 )             42.8     03-25    143  |  108  |  12  ----------------------------<  86  4.1   |  27  |  0.74    Ca    9.2      25 Mar 2024 07:23    TPro  7.1  /  Alb  3.2<L>  /  TBili  0.5  /  DBili  x   /  AST  17  /  ALT  23  /  AlkPhos  55  03-25    Culture - Blood (collected 03-22-24 @ 09:40)  Source: .Blood Blood-Peripheral  Preliminary Report (03-24-24 @ 15:01):    No growth at 48 Hours    Culture - Blood (collected 03-22-24 @ 09:10)  Source: .Blood Blood-Peripheral  Preliminary Report (03-24-24 @ 15:01):    No growth at 48 Hours    Culture - Blood (collected 03-20-24 @ 10:35)  Source: .Blood Blood-Peripheral  Gram Stain (03-22-24 @ 06:29):    Growth in anaerobic bottle: Gram Positive Cocci in Clusters  Final Report (03-23-24 @ 13:12):    Growth in anaerobic bottle: Staphylococcus aureus    See previous culture [id number]    Culture - Blood (collected 03-20-24 @ 10:30)  Source: .Blood Blood-Peripheral  Gram Stain (03-22-24 @ 00:44):    Growth in anaerobic bottle: Gram Positive Cocci in Clusters  Final Report (03-23-24 @ 10:40):    Growth in anaerobic bottle: Staphylococcus aureus  Organism: Staphylococcus aureus (03-23-24 @ 10:40)  Organism: Staphylococcus aureus (03-23-24 @ 10:40)    Sensitivities:      Method Type: HEYDI      -  Ampicillin/Sulbactam: S <=8/4      -  Cefazolin: S <=4      -  Clindamycin: S <=0.25      -  Erythromycin: S <=0.25      -  Gentamicin: S <=1 Should not be used as monotherapy      -  Oxacillin: S 0.5 Oxacillin predicts susceptibility for dicloxacillin, methicillin, and nafcillin      -  Penicillin: R >8      -  Rifampin: S <=1 Should not be used as monotherapy      -  Tetracycline: S <=1      -  Trimethoprim/Sulfamethoxazole: S <=0.5/9.5      -  Vancomycin: S 1    Culture - Blood (collected 03-18-24 @ 23:10)  Source: .Blood Blood-Peripheral  Gram Stain (03-21-24 @ 03:31):    Growth in aerobic bottle: Gram Positive Cocci in Clusters    Growth in anaerobic bottle: Gram Positive Cocci in Clusters  Final Report (03-21-24 @ 18:17):    Growth in aerobic and anaerobic bottles: Staphylococcus aureus    See previous culture [id number]    Culture - Urine (collected 03-18-24 @ 23:10)  Source: Clean Catch Clean Catch (Midstream)  Final Report (03-20-24 @ 07:03):    No growth    Culture - Blood (collected 03-18-24 @ 23:00)  Source: .Blood Blood-Peripheral  Gram Stain (03-20-24 @ 06:36):    Growth in aerobic bottle: Gram Positive Cocci in Clusters  Final Report (03-20-24 @ 19:49):    Growth in aerobic bottle: Staphylococcus aureus    See previous culture [id number]    Culture - Blood (collected 03-16-24 @ 20:20)  Source: .Blood Blood-Peripheral  Gram Stain (03-17-24 @ 17:29):    Growth in anaerobic bottle: Gram Positive Cocci in Clusters    Growth in aerobic bottle: Gram Positive Cocci in Clusters  Final Report (03-19-24 @ 10:34):    Growth in aerobic and anaerobic bottles: Staphylococcus aureus    See previous culture [id number]    Culture - Blood (collected 03-16-24 @ 20:05)  Source: .Blood Blood-Peripheral  Gram Stain (03-17-24 @ 16:55):    Growth in aerobic bottle: Gram Positive Cocci in Clusters    Growth in anaerobic bottle: Gram Positive Cocci in Clusters  Final Report (03-19-24 @ 10:34):    Growth in aerobic and anaerobic bottles: Staphylococcus aureus    Direct identification is available within approximately 3-5    hours either by Blood Panel Multiplexed PCR or Direct    MALDI-TOF. Details: [email or web address]  Organism: Blood Culture PCR  Staphylococcus aureus (03-19-24 @ 10:34)  Organism: Staphylococcus aureus (03-19-24 @ 10:34)    Sensitivities:      Method Type: HEYDI      -  Ampicillin/Sulbactam: S <=8/4      -  Cefazolin: S <=4      -  Clindamycin: S <=0.25      -  Erythromycin: S <=0.25      -  Gentamicin: S <=1 Should not be used as monotherapy      -  Oxacillin: S 0.5 Oxacillin predicts susceptibility for dicloxacillin, methicillin, and nafcillin      -  Penicillin: R >8      -  Rifampin: S <=1 Should not be used as monotherapy      -  Tetracycline: S <=1      -  Trimethoprim/Sulfamethoxazole: S <=0.5/9.5      -  Vancomycin: S 1  Organism: Blood Culture PCR (03-19-24 @ 10:34)    Sensitivities:      Method Type: PCR      -  Methicillin SENSITIVE Staphylococcus aureus (MSSA): Detec Any isolate of Staphylococcus aureus from a blood culture is NOT considered a contaminant.    WBC Count: 7.56 K/uL (03-25-24 @ 07:23)  WBC Count: 6.36 K/uL (03-24-24 @ 08:01)  WBC Count: 6.31 K/uL (03-23-24 @ 07:32)  WBC Count: 7.13 K/uL (03-22-24 @ 05:58)    Creatinine: 0.74 mg/dL (03-25-24 @ 07:23)  Creatinine: 0.88 mg/dL (03-24-24 @ 08:01)  Creatinine: 0.78 mg/dL (03-23-24 @ 07:32)  Creatinine: 0.91 mg/dL (03-22-24 @ 05:58)    C-Reactive Protein, Serum: 19 mg/L (03-25-24 @ 07:23)    Sedimentation Rate, Erythrocyte: 26 mm/hr (03-20-24 @ 06:18)    SARS-CoV-2: NotDetec (03-19-24 @ 03:58)  SARS-CoV-2: NotDetec (03-16-24 @ 20:17)    All imaging and other data have been reviewed.  < from: CT Abdomen and Pelvis w/ IV Cont (03.19.24 @ 00:45) >  IMPRESSION:  No acute abdominopelvic abnormality.    < from: CT Chest w/ IV Cont (03.16.24 @ 21:52) >  IMPRESSION:  No acute findings detected.    < from: CT Hip w/ IV Cont, Bilateral (03.22.24 @ 12:36) >  IMPRESSION:  Bilateral hip arthroplasties without periprosthetic lucency noted.  No CT evidence for osteomyelitis. No drainable fluid collection.    < from: CT Lumbar Spine w/ IV Cont (03.22.24 @ 12:27) >  IMPRESSION:  No evidence of an acute thoracic or lumbar spine fracture.  No suspicious endplate erosion or destruction.  No paraspinal collection.    Assessment and Plan:   58 yo man with history of bilateral hip replacement was admitted with positive blood cultures. He had fever for few days, was seen in an urgent care with possible R lung consolidation for which azithromycin given.   He was seen in ED on 3/16 and had labs and Blood cultures that came back positive in both sets.   Currently he is afebrile with no respiratory, GI,  or any other complaint. No skin lesion or rash. No sick contact, no contact sports.   JIMMY 3/22 negative     Source of staph bacteremia unclear at this time, Blood cultures still positive, today had a negative JIMMY.    At this time no back or hip pain but since spine is the common area for staph infection and also he has hip replacement CT were done, not suspecting septic arthritis, OM or discitis.     # MSSA Bacteremia   # URI with coronavirus     - Blood cultures with MSSA on 3/16, 3/18 and 3/20  - Repeat blood cultures from 3/22 NGTD   - UC negative   - WBC and Tmax normal  - TTE and JIMMY with no vegetations   - Spine and Hips CT negative   - Proceed with PICC line today   - Will treat for 6 weeks IV cefazolin due to unknown source and prolonged bacteremia   - Needs a follow up with me in few week   - Continue Cefazolin 2gm q8  (5/2/24 last day)  - CBC, CMP weekly     Will follow PRN please call with any question.    Jean Pierre Leger MD  Division of Infectious Diseases   Please call ID service at [phone] with any question.    50 minutes spent on total encounter assessing patient, examination, chart review, counseling and coordinating care by the attending physician/nurse/care manager.  
INTERVAL HPI/OVERNIGHT EVENTS: Patient seen and examined at bedside. States he is feeling well, denies any chest pain, SOB, abd pain. Wants to know his recent blood culture results- pending. Hoping to have JIMMY in AM.    ROS: All other review of systems is negative unless indicated above.    MEDICATIONS  (STANDING):  ceFAZolin   IVPB 2000 milliGRAM(s) IV Intermittent every 8 hours  mupirocin 2% Nasal 1 Application(s) Both Nostrils two times a day    MEDICATIONS  (PRN):  acetaminophen     Tablet .. 650 milliGRAM(s) Oral every 6 hours PRN Temp greater or equal to 38C (100.4F), Mild Pain (1 - 3)  aluminum hydroxide/magnesium hydroxide/simethicone Suspension 30 milliLiter(s) Oral every 4 hours PRN Dyspepsia  melatonin 3 milliGRAM(s) Oral at bedtime PRN Insomnia  ondansetron Injectable 4 milliGRAM(s) IV Push every 8 hours PRN Nausea and/or Vomiting      Allergies    No Known Allergies    Intolerances            Vital Signs Last 24 Hrs  T(C): 37.1 (21 Mar 2024 20:00), Max: 37.1 (21 Mar 2024 04:55)  T(F): 98.7 (21 Mar 2024 20:00), Max: 98.8 (21 Mar 2024 04:55)  HR: 75 (21 Mar 2024 20:00) (61 - 75)  BP: 128/83 (21 Mar 2024 20:00) (128/71 - 134/76)  BP(mean): --  RR: 18 (21 Mar 2024 20:00) (18 - 18)  SpO2: 93% (21 Mar 2024 20:00) (92% - 97%)    Parameters below as of 21 Mar 2024 20:00  Patient On (Oxygen Delivery Method): room air          Physical Exam:  General: laying comfortably in bed, NAD  Neurology: A&Ox3, nonfocal, BAÑUELOS x 4  Respiratory: CTA B/L  CV: RRR, S1S2, no murmurs, rubs or gallops  Abdominal: Soft, NT, ND +BS  Extremities: No edema, + peripheral pulses  Skin: warm, dry, no lesions/scratches noted      LABS:                        14.4   5.45  )-----------( 256      ( 20 Mar 2024 10:30 )             43.3     03-20    143  |  106  |  9   ----------------------------<  111<H>  3.5   |  30  |  0.75    Ca    8.8      20 Mar 2024 10:30    TPro  7.0  /  Alb  3.2<L>  /  TBili  0.5  /  DBili  x   /  AST  18  /  ALT  30  /  AlkPhos  53  03-20      Urinalysis Basic - ( 20 Mar 2024 10:30 )    Color: x / Appearance: x / SG: x / pH: x  Gluc: 111 mg/dL / Ketone: x  / Bili: x / Urobili: x   Blood: x / Protein: x / Nitrite: x   Leuk Esterase: x / RBC: x / WBC x   Sq Epi: x / Non Sq Epi: x / Bacteria: x        RADIOLOGY & ADDITIONAL TESTS:
INTERVAL HPI/OVERNIGHT EVENTS: Patient seen and examined earlier this AM. States he is feeling well. Denies any chest pain, SOB, abd pain. Denies any recent travel, denies any recent sick contacts. States he has two cats at home- denies any cat scratches or bites. Afebrile overnight.    ROS: All other review of systems is negative unless indicated above.    MEDICATIONS  (STANDING):  ceFAZolin   IVPB 2000 milliGRAM(s) IV Intermittent every 8 hours    MEDICATIONS  (PRN):  acetaminophen     Tablet .. 650 milliGRAM(s) Oral every 6 hours PRN Temp greater or equal to 38C (100.4F), Mild Pain (1 - 3)  aluminum hydroxide/magnesium hydroxide/simethicone Suspension 30 milliLiter(s) Oral every 4 hours PRN Dyspepsia  melatonin 3 milliGRAM(s) Oral at bedtime PRN Insomnia  ondansetron Injectable 4 milliGRAM(s) IV Push every 8 hours PRN Nausea and/or Vomiting      Allergies    No Known Allergies    Intolerances            Vital Signs Last 24 Hrs  T(C): 37.2 (19 Mar 2024 12:38), Max: 37.4 (18 Mar 2024 19:15)  T(F): 99 (19 Mar 2024 12:38), Max: 99.3 (18 Mar 2024 19:15)  HR: 71 (19 Mar 2024 12:38) (70 - 87)  BP: 138/83 (19 Mar 2024 12:38) (137/89 - 160/87)  BP(mean): --  RR: 16 (19 Mar 2024 12:38) (16 - 18)  SpO2: 99% (19 Mar 2024 12:38) (92% - 99%)    Parameters below as of 19 Mar 2024 12:38  Patient On (Oxygen Delivery Method): room air        03-19 @ 07:01  -  03-19 @ 17:46  --------------------------------------------------------  IN: 960 mL / OUT: 0 mL / NET: 960 mL      Physical Exam:  General: laying comfortably in bed, NAD  Neurology: A&Ox3, nonfocal, BAÑUELOS x 4  Respiratory: CTA B/L  CV: RRR, S1S2, no murmurs, rubs or gallops  Abdominal: Soft, NT, ND +BS  Extremities: No edema, + peripheral pulses  Skin: warm, dry, no lesions/scratches noted      LABS:                        13.1   5.77  )-----------( 210      ( 19 Mar 2024 04:36 )             39.1     03-19    142  |  107  |  10  ----------------------------<  112<H>  3.5   |  26  |  0.71    Ca    8.4<L>      19 Mar 2024 04:36    TPro  6.6  /  Alb  3.1<L>  /  TBili  0.5  /  DBili  x   /  AST  21  /  ALT  34  /  AlkPhos  50  03-19    PT/INR - ( 18 Mar 2024 23:10 )   PT: 11.9 sec;   INR: 1.02 ratio         PTT - ( 18 Mar 2024 23:10 )  PTT:29.5 sec  Urinalysis Basic - ( 19 Mar 2024 04:36 )    Color: x / Appearance: x / SG: x / pH: x  Gluc: 112 mg/dL / Ketone: x  / Bili: x / Urobili: x   Blood: x / Protein: x / Nitrite: x   Leuk Esterase: x / RBC: x / WBC x   Sq Epi: x / Non Sq Epi: x / Bacteria: x        RADIOLOGY & ADDITIONAL TESTS:
Patient is a 59y old  Male who presents with a chief complaint of MSSA bacteremia (24 Mar 2024 11:47)      INTERVAL HPI/OVERNIGHT EVENTS: No acute overnight events. Pt was seen and examined at bedside. Patient states he feels he is in his usual state of health and denies fevers/chills, HA, LH, cough, sob, cp, palp, n/v/d/c, dysuria, dyschezia. Pt would like his PICC line and to go home.    MEDICATIONS  (STANDING):  ceFAZolin   IVPB 2000 milliGRAM(s) IV Intermittent every 8 hours  mupirocin 2% Nasal 1 Application(s) Both Nostrils two times a day    MEDICATIONS  (PRN):  acetaminophen     Tablet .. 650 milliGRAM(s) Oral every 6 hours PRN Temp greater or equal to 38C (100.4F), Mild Pain (1 - 3)  aluminum hydroxide/magnesium hydroxide/simethicone Suspension 30 milliLiter(s) Oral every 4 hours PRN Dyspepsia  melatonin 3 milliGRAM(s) Oral at bedtime PRN Insomnia  ondansetron Injectable 4 milliGRAM(s) IV Push every 8 hours PRN Nausea and/or Vomiting      Allergies    No Known Allergies    Intolerances        REVIEW OF SYSTEMS:  CONSTITUTIONAL: No fever or chills  HEENT:  No headache, no sore throat  RESPIRATORY: No cough, wheezing, or shortness of breath  CARDIOVASCULAR: No chest pain, palpitations  GASTROINTESTINAL: No abd pain, nausea, vomiting, or diarrhea  GENITOURINARY: No dysuria, frequency, or hematuria  NEUROLOGICAL: no focal weakness or dizziness  MUSCULOSKELETAL: no myalgias     Vital Signs Last 24 Hrs  T(C): 36.9 (25 Mar 2024 05:34), Max: 36.9 (25 Mar 2024 05:34)  T(F): 98.4 (25 Mar 2024 05:34), Max: 98.4 (25 Mar 2024 05:34)  HR: 64 (25 Mar 2024 05:34) (61 - 74)  BP: 137/62 (25 Mar 2024 05:34) (106/59 - 138/75)  BP(mean): --  RR: 18 (25 Mar 2024 05:34) (18 - 18)  SpO2: 94% (25 Mar 2024 05:34) (93% - 97%)    Parameters below as of 25 Mar 2024 05:34  Patient On (Oxygen Delivery Method): room air        PHYSICAL EXAM:  GENERAL: NAD  HEENT:  anicteric, moist mucous membranes  CHEST/LUNG:  CTA b/l, no rales, wheezes, or rhonchi  HEART:  RRR, S1, S2  ABDOMEN:  BS+, soft, nontender, nondistended  EXTREMITIES: no edema, cyanosis, or calf tenderness. DP Pulses 2+ and symmetric BL.  NERVOUS SYSTEM: AAO x3. Answers questions and follows commands appropriately    LABS:                        14.0   7.56  )-----------( 353      ( 25 Mar 2024 07:23 )             42.8     CBC Full  -  ( 25 Mar 2024 07:23 )  WBC Count : 7.56 K/uL  Hemoglobin : 14.0 g/dL  Hematocrit : 42.8 %  Platelet Count - Automated : 353 K/uL  Mean Cell Volume : 86.5 fl  Mean Cell Hemoglobin : 28.3 pg  Mean Cell Hemoglobin Concentration : 32.7 gm/dL  Auto Neutrophil # : 4.48 K/uL  Auto Lymphocyte # : 2.10 K/uL  Auto Monocyte # : 0.71 K/uL  Auto Eosinophil # : 0.20 K/uL  Auto Basophil # : 0.05 K/uL  Auto Neutrophil % : 59.2 %  Auto Lymphocyte % : 27.8 %  Auto Monocyte % : 9.4 %  Auto Eosinophil % : 2.6 %  Auto Basophil % : 0.7 %    25 Mar 2024 07:23    143    |  108    |  12     ----------------------------<  86     4.1     |  27     |  0.74     Ca    9.2        25 Mar 2024 07:23    TPro  7.1    /  Alb  3.2    /  TBili  0.5    /  DBili  x      /  AST  17     /  ALT  23     /  AlkPhos  55     25 Mar 2024 07:23      Urinalysis Basic - ( 25 Mar 2024 07:23 )    Color: x / Appearance: x / SG: x / pH: x  Gluc: 86 mg/dL / Ketone: x  / Bili: x / Urobili: x   Blood: x / Protein: x / Nitrite: x   Leuk Esterase: x / RBC: x / WBC x   Sq Epi: x / Non Sq Epi: x / Bacteria: x      CAPILLARY BLOOD GLUCOSE            Culture - Blood (collected 03-22-24 @ 09:40)  Source: .Blood Blood-Peripheral  Preliminary Report (03-24-24 @ 15:01):    No growth at 48 Hours    Culture - Blood (collected 03-22-24 @ 09:10)  Source: .Blood Blood-Peripheral  Preliminary Report (03-24-24 @ 15:01):    No growth at 48 Hours    Culture - Blood (collected 03-20-24 @ 10:35)  Source: .Blood Blood-Peripheral  Gram Stain (03-22-24 @ 06:29):    Growth in anaerobic bottle: Gram Positive Cocci in Clusters  Final Report (03-23-24 @ 13:12):    Growth in anaerobic bottle: Staphylococcus aureus    See previous culture [id number]    Culture - Blood (collected 03-20-24 @ 10:30)  Source: .Blood Blood-Peripheral  Gram Stain (03-22-24 @ 00:44):    Growth in anaerobic bottle: Gram Positive Cocci in Clusters  Final Report (03-23-24 @ 10:40):    Growth in anaerobic bottle: Staphylococcus aureus  Organism: Staphylococcus aureus (03-23-24 @ 10:40)  Organism: Staphylococcus aureus (03-23-24 @ 10:40)      Method Type: HEYDI      -  Ampicillin/Sulbactam: S <=8/4      -  Cefazolin: S <=4      -  Clindamycin: S <=0.25      -  Erythromycin: S <=0.25      -  Gentamicin: S <=1 Should not be used as monotherapy      -  Oxacillin: S 0.5 Oxacillin predicts susceptibility for dicloxacillin, methicillin, and nafcillin      -  Penicillin: R >8      -  Rifampin: S <=1 Should not be used as monotherapy      -  Tetracycline: S <=1      -  Trimethoprim/Sulfamethoxazole: S <=0.5/9.5      -  Vancomycin: S 1    Culture - Blood (collected 03-18-24 @ 23:10)  Source: .Blood Blood-Peripheral  Gram Stain (03-21-24 @ 03:31):    Growth in aerobic bottle: Gram Positive Cocci in Clusters    Growth in anaerobic bottle: Gram Positive Cocci in Clusters  Final Report (03-21-24 @ 18:17):    Growth in aerobic and anaerobic bottles: Staphylococcus aureus    See previous culture [id number]    Culture - Urine (collected 03-18-24 @ 23:10)  Source: Clean Catch Clean Catch (Midstream)  Final Report (03-20-24 @ 07:03):    No growth    Culture - Blood (collected 03-18-24 @ 23:00)  Source: .Blood Blood-Peripheral  Gram Stain (03-20-24 @ 06:36):    Growth in aerobic bottle: Gram Positive Cocci in Clusters  Final Report (03-20-24 @ 19:49):    Growth in aerobic bottle: Staphylococcus aureus    See previous culture [id number]        Consultant(s) Notes Reviewed:  [x] YES  [ ] NO    
Patient is a 59y old  Male who presents with a chief complaint of MSSA bacteremia (22 Mar 2024 12:18)      INTERVAL HPI/OVERNIGHT EVENTS: Pt was seen and examined at bedside. No acute overnight events. Pt states that  Pt denies headache, dizziness, lightheadedness, fever, chills, body aches, CP, SOB, palpitations, abdominal pain, n/v, numbness/tingling.  No other complaints at this time.     MEDICATIONS  (STANDING):  ceFAZolin   IVPB 2000 milliGRAM(s) IV Intermittent every 8 hours  dextrose 5% + sodium chloride 0.45%. 1000 milliLiter(s) (50 mL/Hr) IV Continuous <Continuous>  mupirocin 2% Nasal 1 Application(s) Both Nostrils two times a day    MEDICATIONS  (PRN):  acetaminophen     Tablet .. 650 milliGRAM(s) Oral every 6 hours PRN Temp greater or equal to 38C (100.4F), Mild Pain (1 - 3)  aluminum hydroxide/magnesium hydroxide/simethicone Suspension 30 milliLiter(s) Oral every 4 hours PRN Dyspepsia  melatonin 3 milliGRAM(s) Oral at bedtime PRN Insomnia  ondansetron Injectable 4 milliGRAM(s) IV Push every 8 hours PRN Nausea and/or Vomiting      Allergies    No Known Allergies    Intolerances        REVIEW OF SYSTEMS:  CONSTITUTIONAL: No fever or chills  HEENT:  No headache, no sore throat  RESPIRATORY: No cough, wheezing, or shortness of breath  CARDIOVASCULAR: No chest pain, palpitations  GASTROINTESTINAL: No abd pain, nausea, vomiting, or diarrhea  GENITOURINARY: No dysuria, frequency, or hematuria  NEUROLOGICAL: no focal weakness or dizziness  MUSCULOSKELETAL: no myalgias     Vital Signs Last 24 Hrs  T(C): 36.7 (22 Mar 2024 11:42), Max: 37.1 (21 Mar 2024 20:00)  T(F): 98.1 (22 Mar 2024 11:42), Max: 98.7 (21 Mar 2024 20:00)  HR: 70 (22 Mar 2024 11:42) (64 - 78)  BP: 118/73 (22 Mar 2024 11:42) (98/59 - 136/93)  BP(mean): --  RR: 18 (22 Mar 2024 11:42) (14 - 19)  SpO2: 95% (22 Mar 2024 11:42) (93% - 99%)    Parameters below as of 22 Mar 2024 11:42  Patient On (Oxygen Delivery Method): room air        PHYSICAL EXAM:  GENERAL: NAD  HEENT:  anicteric, moist mucous membranes  CHEST/LUNG:  CTA b/l, no rales, wheezes, or rhonchi  HEART:  RRR, S1, S2  ABDOMEN:  BS+, soft, nontender, nondistended  EXTREMITIES: no edema, cyanosis, or calf tenderness  NERVOUS SYSTEM: answers questions and follows commands appropriately    LABS:                        13.8   7.13  )-----------( 315      ( 22 Mar 2024 05:58 )             42.4     CBC Full  -  ( 22 Mar 2024 05:58 )  WBC Count : 7.13 K/uL  Hemoglobin : 13.8 g/dL  Hematocrit : 42.4 %  Platelet Count - Automated : 315 K/uL  Mean Cell Volume : 86.0 fl  Mean Cell Hemoglobin : 28.0 pg  Mean Cell Hemoglobin Concentration : 32.5 gm/dL  Auto Neutrophil # : x  Auto Lymphocyte # : x  Auto Monocyte # : x  Auto Eosinophil # : x  Auto Basophil # : x  Auto Neutrophil % : x  Auto Lymphocyte % : x  Auto Monocyte % : x  Auto Eosinophil % : x  Auto Basophil % : x    22 Mar 2024 05:58    144    |  107    |  13     ----------------------------<  91     4.6     |  31     |  0.91     Ca    9.1        22 Mar 2024 05:58        Urinalysis Basic - ( 22 Mar 2024 05:58 )    Color: x / Appearance: x / SG: x / pH: x  Gluc: 91 mg/dL / Ketone: x  / Bili: x / Urobili: x   Blood: x / Protein: x / Nitrite: x   Leuk Esterase: x / RBC: x / WBC x   Sq Epi: x / Non Sq Epi: x / Bacteria: x      CAPILLARY BLOOD GLUCOSE            Culture - Blood (collected 03-20-24 @ 10:35)  Source: .Blood Blood-Peripheral  Gram Stain (03-22-24 @ 06:29):    Growth in anaerobic bottle: Gram Positive Cocci in Clusters  Preliminary Report (03-22-24 @ 06:29):    Growth in anaerobic bottle: Gram Positive Cocci in Clusters    Culture - Blood (collected 03-20-24 @ 10:30)  Source: .Blood Blood-Peripheral  Gram Stain (03-22-24 @ 00:44):    Growth in anaerobic bottle: Gram Positive Cocci in Clusters  Preliminary Report (03-22-24 @ 00:44):    Growth in anaerobic bottle: Gram Positive Cocci in Clusters    Culture - Urine (collected 03-18-24 @ 23:10)  Source: Clean Catch Clean Catch (Midstream)  Final Report (03-20-24 @ 07:03):    No growth    Culture - Blood (collected 03-18-24 @ 23:10)  Source: .Blood Blood-Peripheral  Gram Stain (03-21-24 @ 03:31):    Growth in aerobic bottle: Gram Positive Cocci in Clusters    Growth in anaerobic bottle: Gram Positive Cocci in Clusters  Final Report (03-21-24 @ 18:17):    Growth in aerobic and anaerobic bottles: Staphylococcus aureus    See previous culture [id number]    Culture - Blood (collected 03-18-24 @ 23:00)  Source: .Blood Blood-Peripheral  Gram Stain (03-20-24 @ 06:36):    Growth in aerobic bottle: Gram Positive Cocci in Clusters  Final Report (03-20-24 @ 19:49):    Growth in aerobic bottle: Staphylococcus aureus    See previous culture [id number]    Culture - Blood (collected 03-16-24 @ 20:20)  Source: .Blood Blood-Peripheral  Gram Stain (03-17-24 @ 17:29):    Growth in anaerobic bottle: Gram Positive Cocci in Clusters    Growth in aerobic bottle: Gram Positive Cocci in Clusters  Final Report (03-19-24 @ 10:34):    Growth in aerobic and anaerobic bottles: Staphylococcus aureus    See previous culture [id number]    Culture - Blood (collected 03-16-24 @ 20:05)  Source: .Blood Blood-Peripheral  Gram Stain (03-17-24 @ 16:55):    Growth in aerobic bottle: Gram Positive Cocci in Clusters    Growth in anaerobic bottle: Gram Positive Cocci in Clusters  Final Report (03-19-24 @ 10:34):    Growth in aerobic and anaerobic bottles: Staphylococcus aureus    Direct identification is available within approximately 3-5    hours either by Blood Panel Multiplexed PCR or Direct    MALDI-TOF. Details: [email or web address]  Organism: Blood Culture PCR  Staphylococcus aureus (03-19-24 @ 10:34)  Organism: Staphylococcus aureus (03-19-24 @ 10:34)      Method Type: HEYDI      -  Ampicillin/Sulbactam: S <=8/4      -  Cefazolin: S <=4      -  Clindamycin: S <=0.25      -  Erythromycin: S <=0.25      -  Gentamicin: S <=1 Should not be used as monotherapy      -  Oxacillin: S 0.5 Oxacillin predicts susceptibility for dicloxacillin, methicillin, and nafcillin      -  Penicillin: R >8      -  Rifampin: S <=1 Should not be used as monotherapy      -  Tetracycline: S <=1      -  Trimethoprim/Sulfamethoxazole: S <=0.5/9.5      -  Vancomycin: S 1  Organism: Blood Culture PCR (03-19-24 @ 10:34)      Method Type: PCR      -  Methicillin SENSITIVE Staphylococcus aureus (MSSA): Detec Any isolate of Staphylococcus aureus from a blood culture is NOT considered a contaminant.        RADIOLOGY & ADDITIONAL TESTS: ____    Personally reviewed.     Consultant(s) Notes Reviewed:  [x] YES  [ ] NO    
Patient is a 59y old  Male who presents with a chief complaint of MSSA bacteremia (22 Mar 2024 16:32)      Subjective:  INTERVAL HPI/OVERNIGHT EVENTS: Patient seen and examined at bedside. No overnight events occurred. Patient has no complaints at this time. Denies fevers, chills.     MEDICATIONS  (STANDING):  ceFAZolin   IVPB 2000 milliGRAM(s) IV Intermittent every 8 hours  dextrose 5% + sodium chloride 0.45%. 1000 milliLiter(s) (50 mL/Hr) IV Continuous <Continuous>  mupirocin 2% Nasal 1 Application(s) Both Nostrils two times a day    MEDICATIONS  (PRN):  acetaminophen     Tablet .. 650 milliGRAM(s) Oral every 6 hours PRN Temp greater or equal to 38C (100.4F), Mild Pain (1 - 3)  aluminum hydroxide/magnesium hydroxide/simethicone Suspension 30 milliLiter(s) Oral every 4 hours PRN Dyspepsia  melatonin 3 milliGRAM(s) Oral at bedtime PRN Insomnia  ondansetron Injectable 4 milliGRAM(s) IV Push every 8 hours PRN Nausea and/or Vomiting      Allergies    No Known Allergies    Intolerances        REVIEW OF SYSTEMS:  CONSTITUTIONAL: No fever or chills  HEENT:  No headache, no sore throat  RESPIRATORY: No cough, wheezing, or shortness of breath  CARDIOVASCULAR: No chest pain, palpitations  GASTROINTESTINAL: No abd pain, nausea, vomiting, or diarrhea  GENITOURINARY: No dysuria, frequency, or hematuria  NEUROLOGICAL: no focal weakness or dizziness  MUSCULOSKELETAL: no myalgias     Objective:  Vital Signs Last 24 Hrs  T(C): 36.7 (23 Mar 2024 05:32), Max: 37 (22 Mar 2024 19:50)  T(F): 98.1 (23 Mar 2024 05:32), Max: 98.6 (22 Mar 2024 19:50)  HR: 59 (23 Mar 2024 05:32) (59 - 70)  BP: 117/77 (23 Mar 2024 05:32) (117/71 - 118/73)  BP(mean): --  RR: 18 (23 Mar 2024 05:32) (18 - 18)  SpO2: 92% (23 Mar 2024 05:32) (92% - 95%)    Parameters below as of 23 Mar 2024 05:32  Patient On (Oxygen Delivery Method): room air        GENERAL: NAD, lying in bed comfortably  HEAD:  Atraumatic, Normocephalic  EYES: EOMI,  conjunctiva and sclera clear  ENT: Moist mucous membranes  NECK: Supple, No JVD  CHEST/LUNG: Clear to auscultation bilaterally; No rales, rhonchi, wheezing, or rubs. Unlabored respirations  HEART: Regular rate and rhythm; No murmurs, rubs, or gallops  ABDOMEN: Bowel sounds present; Soft, Nontender, Nondistended. No hepatomegaly  EXTREMITIES:  2+ Peripheral Pulses, brisk capillary refill. No clubbing, cyanosis, or edema  NERVOUS SYSTEM:  Alert & Oriented X3, speech clear. No deficits   MSK: FROM all 4 extremities, full and equal strength  SKIN: warm, dry    LABS:                        13.7   6.31  )-----------( 331      ( 23 Mar 2024 07:32 )             42.3     CBC Full  -  ( 23 Mar 2024 07:32 )  WBC Count : 6.31 K/uL  Hemoglobin : 13.7 g/dL  Hematocrit : 42.3 %  Platelet Count - Automated : 331 K/uL  Mean Cell Volume : 87.0 fl  Mean Cell Hemoglobin : 28.2 pg  Mean Cell Hemoglobin Concentration : 32.4 gm/dL  Auto Neutrophil # : 3.91 K/uL  Auto Lymphocyte # : 1.57 K/uL  Auto Monocyte # : 0.54 K/uL  Auto Eosinophil # : 0.21 K/uL  Auto Basophil # : 0.05 K/uL  Auto Neutrophil % : 61.9 %  Auto Lymphocyte % : 24.9 %  Auto Monocyte % : 8.6 %  Auto Eosinophil % : 3.3 %  Auto Basophil % : 0.8 %    23 Mar 2024 07:32    142    |  105    |  11     ----------------------------<  94     4.3     |  31     |  0.78     Ca    9.0        23 Mar 2024 07:32    TPro  6.9    /  Alb  3.2    /  TBili  0.4    /  DBili  x      /  AST  19     /  ALT  24     /  AlkPhos  53     23 Mar 2024 07:32      Urinalysis Basic - ( 23 Mar 2024 07:32 )    Color: x / Appearance: x / SG: x / pH: x  Gluc: 94 mg/dL / Ketone: x  / Bili: x / Urobili: x   Blood: x / Protein: x / Nitrite: x   Leuk Esterase: x / RBC: x / WBC x   Sq Epi: x / Non Sq Epi: x / Bacteria: x      CAPILLARY BLOOD GLUCOSE            Culture - Blood (collected 03-20-24 @ 10:35)  Source: .Blood Blood-Peripheral  Gram Stain (03-22-24 @ 06:29):    Growth in anaerobic bottle: Gram Positive Cocci in Clusters  Preliminary Report (03-22-24 @ 18:53):    Growth in anaerobic bottle: Staphylococcus aureus    See previous culture [id number]    Culture - Blood (collected 03-20-24 @ 10:30)  Source: .Blood Blood-Peripheral  Gram Stain (03-22-24 @ 00:44):    Growth in anaerobic bottle: Gram Positive Cocci in Clusters  Final Report (03-23-24 @ 10:40):    Growth in anaerobic bottle: Staphylococcus aureus  Organism: Staphylococcus aureus (03-23-24 @ 10:40)  Organism: Staphylococcus aureus (03-23-24 @ 10:40)      Method Type: HEYDI      -  Ampicillin/Sulbactam: S <=8/4      -  Cefazolin: S <=4      -  Clindamycin: S <=0.25      -  Erythromycin: S <=0.25      -  Gentamicin: S <=1 Should not be used as monotherapy      -  Oxacillin: S 0.5 Oxacillin predicts susceptibility for dicloxacillin, methicillin, and nafcillin      -  Penicillin: R >8      -  Rifampin: S <=1 Should not be used as monotherapy      -  Tetracycline: S <=1      -  Trimethoprim/Sulfamethoxazole: S <=0.5/9.5      -  Vancomycin: S 1    Culture - Blood (collected 03-18-24 @ 23:10)  Source: .Blood Blood-Peripheral  Gram Stain (03-21-24 @ 03:31):    Growth in aerobic bottle: Gram Positive Cocci in Clusters    Growth in anaerobic bottle: Gram Positive Cocci in Clusters  Final Report (03-21-24 @ 18:17):    Growth in aerobic and anaerobic bottles: Staphylococcus aureus    See previous culture [id number]    Culture - Urine (collected 03-18-24 @ 23:10)  Source: Clean Catch Clean Catch (Midstream)  Final Report (03-20-24 @ 07:03):    No growth    Culture - Blood (collected 03-18-24 @ 23:00)  Source: .Blood Blood-Peripheral  Gram Stain (03-20-24 @ 06:36):    Growth in aerobic bottle: Gram Positive Cocci in Clusters  Final Report (03-20-24 @ 19:49):    Growth in aerobic bottle: Staphylococcus aureus    See previous culture [id number]    Culture - Blood (collected 03-16-24 @ 20:20)  Source: .Blood Blood-Peripheral  Gram Stain (03-17-24 @ 17:29):    Growth in anaerobic bottle: Gram Positive Cocci in Clusters    Growth in aerobic bottle: Gram Positive Cocci in Clusters  Final Report (03-19-24 @ 10:34):    Growth in aerobic and anaerobic bottles: Staphylococcus aureus    See previous culture [id number]    Culture - Blood (collected 03-16-24 @ 20:05)  Source: .Blood Blood-Peripheral  Gram Stain (03-17-24 @ 16:55):    Growth in aerobic bottle: Gram Positive Cocci in Clusters    Growth in anaerobic bottle: Gram Positive Cocci in Clusters  Final Report (03-19-24 @ 10:34):    Growth in aerobic and anaerobic bottles: Staphylococcus aureus    Direct identification is available within approximately 3-5    hours either by Blood Panel Multiplexed PCR or Direct    MALDI-TOF. Details: [email or web address]  Organism: Blood Culture PCR  Staphylococcus aureus (03-19-24 @ 10:34)  Organism: Staphylococcus aureus (03-19-24 @ 10:34)      Method Type: HEYDI      -  Ampicillin/Sulbactam: S <=8/4      -  Cefazolin: S <=4      -  Clindamycin: S <=0.25      -  Erythromycin: S <=0.25      -  Gentamicin: S <=1 Should not be used as monotherapy      -  Oxacillin: S 0.5 Oxacillin predicts susceptibility for dicloxacillin, methicillin, and nafcillin      -  Penicillin: R >8      -  Rifampin: S <=1 Should not be used as monotherapy      -  Tetracycline: S <=1      -  Trimethoprim/Sulfamethoxazole: S <=0.5/9.5      -  Vancomycin: S 1  Organism: Blood Culture PCR (03-19-24 @ 10:34)      Method Type: PCR      -  Methicillin SENSITIVE Staphylococcus aureus (MSSA): Detec Any isolate of Staphylococcus aureus from a blood culture is NOT considered a contaminant.        RADIOLOGY & ADDITIONAL TESTS:    Personally reviewed.     Consultant(s) Notes Reviewed:  [x] YES  [ ] NO

## 2024-03-27 LAB
CULTURE RESULTS: SIGNIFICANT CHANGE UP
CULTURE RESULTS: SIGNIFICANT CHANGE UP
SPECIMEN SOURCE: SIGNIFICANT CHANGE UP
SPECIMEN SOURCE: SIGNIFICANT CHANGE UP

## 2024-03-30 LAB
CULTURE RESULTS: SIGNIFICANT CHANGE UP
SPECIMEN SOURCE: SIGNIFICANT CHANGE UP

## 2024-04-01 ENCOUNTER — APPOINTMENT (OUTPATIENT)
Dept: INTERNAL MEDICINE | Facility: CLINIC | Age: 59
End: 2024-04-01
Payer: COMMERCIAL

## 2024-04-01 VITALS
OXYGEN SATURATION: 97 % | DIASTOLIC BLOOD PRESSURE: 78 MMHG | TEMPERATURE: 97.9 F | BODY MASS INDEX: 26.13 KG/M2 | WEIGHT: 176.4 LBS | HEART RATE: 82 BPM | SYSTOLIC BLOOD PRESSURE: 118 MMHG | RESPIRATION RATE: 16 BRPM | HEIGHT: 69 IN

## 2024-04-01 DIAGNOSIS — R07.9 CHEST PAIN, UNSPECIFIED: ICD-10-CM

## 2024-04-01 PROCEDURE — 99214 OFFICE O/P EST MOD 30 MIN: CPT

## 2024-04-01 NOTE — PHYSICAL EXAM
[Well Nourished] : well nourished [No Acute Distress] : no acute distress [Well Developed] : well developed [Well-Appearing] : well-appearing [Normal Voice/Communication] : normal voice/communication [Normal Sclera/Conjunctiva] : normal sclera/conjunctiva [PERRL] : pupils equal round and reactive to light [EOMI] : extraocular movements intact [Normal Outer Ear/Nose] : the outer ears and nose were normal in appearance [Normal TMs] : both tympanic membranes were normal [Normal Oropharynx] : the oropharynx was normal [No JVD] : no jugular venous distention [No Lymphadenopathy] : no lymphadenopathy [Supple] : supple [Thyroid Normal, No Nodules] : the thyroid was normal and there were no nodules present [No Respiratory Distress] : no respiratory distress  [No Accessory Muscle Use] : no accessory muscle use [Clear to Auscultation] : lungs were clear to auscultation bilaterally [Normal Rate] : normal rate  [Regular Rhythm] : with a regular rhythm [Normal S1, S2] : normal S1 and S2 [No Murmur] : no murmur heard [No Carotid Bruits] : no carotid bruits [No Abdominal Bruit] : a ~M bruit was not heard ~T in the abdomen [No Varicosities] : no varicosities [Pedal Pulses Present] : the pedal pulses are present [No Edema] : there was no peripheral edema [No Palpable Aorta] : no palpable aorta [Soft] : abdomen soft [No Extremity Clubbing/Cyanosis] : no extremity clubbing/cyanosis [Non Tender] : non-tender [Non-distended] : non-distended [No Masses] : no abdominal mass palpated [No HSM] : no HSM [Normal Bowel Sounds] : normal bowel sounds [Normal Posterior Cervical Nodes] : no posterior cervical lymphadenopathy [Normal Supraclavicular Nodes] : no supraclavicular lymphadenopathy [Normal Anterior Cervical Nodes] : no anterior cervical lymphadenopathy [No CVA Tenderness] : no CVA  tenderness [No Spinal Tenderness] : no spinal tenderness [No Joint Swelling] : no joint swelling [Grossly Normal Strength/Tone] : grossly normal strength/tone [No Rash] : no rash [Coordination Grossly Intact] : coordination grossly intact [No Focal Deficits] : no focal deficits [Normal Gait] : normal gait [Deep Tendon Reflexes (DTR)] : deep tendon reflexes were 2+ and symmetric [Speech Grossly Normal] : speech grossly normal [Memory Grossly Normal] : memory grossly normal [Normal Affect] : the affect was normal [Alert and Oriented x3] : oriented to person, place, and time [Normal Mood] : the mood was normal [Normal Insight/Judgement] : insight and judgment were intact

## 2024-04-01 NOTE — HISTORY OF PRESENT ILLNESS
[FreeTextEntry8] : URSULA CUNHA is a 59 year old M who presents today for an acute visit complaining of sharp pain in his back that began last night. Pt reports experiencing the pain when taking a deep breath. Pt has a hx of HLD. Pt is currently on 6 weeks on IV antibiotics for bacteremia . Pt reports feeling aloof while on antibiotics.

## 2024-04-01 NOTE — END OF VISIT
[FreeTextEntry3] : "I, Saud Shrestha, personally scribed the services dictated to me by Dr. Alfredo Paulson MD in this documentation on 04/01/2024"   "I Dr. Alfredo Paulson MD, personally performed the services described in this documentation on 04/01/2024 for the patient as scribed by Saud Shrestha in my presence. I have reviewed and verified that all the information is accurate and true."

## 2024-04-01 NOTE — HEALTH RISK ASSESSMENT
[Never (0 pts)] : Never (0 points) [No] : In the past 12 months have you used drugs other than those required for medical reasons? No [No falls in past year] : Patient reported no falls in the past year [Little interest or pleasure doing things] : 1) Little interest or pleasure doing things [Feeling down, depressed, or hopeless] : 2) Feeling down, depressed, or hopeless [0] : 2) Feeling down, depressed, or hopeless: Not at all (0) [PHQ-2 Negative - No further assessment needed] : PHQ-2 Negative - No further assessment needed [Never] : Never [[id number]] : 0

## 2024-04-01 NOTE — PLAN
[FreeTextEntry1] : continue medications Atorvastatin  Sent for CT angio chest PE Continue IV antibiotics chronic medical conditions HLD  continue Antibiotics

## 2024-04-06 ENCOUNTER — APPOINTMENT (OUTPATIENT)
Dept: INTERNAL MEDICINE | Facility: CLINIC | Age: 59
End: 2024-04-06
Payer: COMMERCIAL

## 2024-04-06 VITALS
OXYGEN SATURATION: 98 % | RESPIRATION RATE: 14 BRPM | BODY MASS INDEX: 26.07 KG/M2 | WEIGHT: 176 LBS | SYSTOLIC BLOOD PRESSURE: 124 MMHG | HEART RATE: 62 BPM | TEMPERATURE: 98.6 F | HEIGHT: 69 IN | DIASTOLIC BLOOD PRESSURE: 80 MMHG

## 2024-04-06 DIAGNOSIS — E78.00 PURE HYPERCHOLESTEROLEMIA, UNSPECIFIED: ICD-10-CM

## 2024-04-06 DIAGNOSIS — R78.81 BACTEREMIA: ICD-10-CM

## 2024-04-06 DIAGNOSIS — A49.01 METHICILLIN SUSCEPTIBLE STAPHYLOCOCCUS AUREUS INFECTION, UNSPECIFIED SITE: ICD-10-CM

## 2024-04-06 PROCEDURE — 99214 OFFICE O/P EST MOD 30 MIN: CPT

## 2024-04-06 RX ORDER — ALBUTEROL SULFATE 90 UG/1
108 (90 BASE) INHALANT RESPIRATORY (INHALATION)
Qty: 7 | Refills: 0 | Status: ACTIVE | COMMUNITY
Start: 2024-01-02

## 2024-04-06 NOTE — HISTORY OF PRESENT ILLNESS
[FreeTextEntry1] : Follow up [de-identified] : URSULA CUNHA is a 59 year old M who presents today for follow up was recently hospitalized for Bacteremia on Cefazolin for 6 weeks had right sided back pain had negative PE no fever no chills

## 2024-04-06 NOTE — HEALTH RISK ASSESSMENT
[No] : No [No falls in past year] : Patient reported no falls in the past year [0] : 2) Feeling down, depressed, or hopeless: Not at all (0) [PHQ-2 Negative - No further assessment needed] : PHQ-2 Negative - No further assessment needed [[id number]] : 0

## 2024-04-07 LAB
ALBUMIN SERPL ELPH-MCNC: 4.2 G/DL
ALP BLD-CCNC: 66 U/L
ALT SERPL-CCNC: <5 U/L
ANION GAP SERPL CALC-SCNC: 10 MMOL/L
APPEARANCE: CLEAR
AST SERPL-CCNC: 17 U/L
BACTERIA: NEGATIVE /HPF
BILIRUB SERPL-MCNC: 0.5 MG/DL
BILIRUBIN URINE: NEGATIVE
BLOOD URINE: NEGATIVE
BUN SERPL-MCNC: 11 MG/DL
CALCIUM SERPL-MCNC: 9.5 MG/DL
CAST: 0 /LPF
CHLORIDE SERPL-SCNC: 102 MMOL/L
CHOLEST SERPL-MCNC: 219 MG/DL
CK SERPL-CCNC: 86 U/L
CO2 SERPL-SCNC: 28 MMOL/L
COLOR: YELLOW
CREAT SERPL-MCNC: 0.65 MG/DL
EGFR: 110 ML/MIN/1.73M2
EPITHELIAL CELLS: 0 /HPF
GLUCOSE QUALITATIVE U: NEGATIVE MG/DL
GLUCOSE SERPL-MCNC: 85 MG/DL
HDLC SERPL-MCNC: 48 MG/DL
KETONES URINE: NEGATIVE MG/DL
LDLC SERPL CALC-MCNC: 155 MG/DL
LEUKOCYTE ESTERASE URINE: ABNORMAL
MICROSCOPIC-UA: NORMAL
NITRITE URINE: NEGATIVE
NONHDLC SERPL-MCNC: 171 MG/DL
PH URINE: 6.5
POTASSIUM SERPL-SCNC: 4.5 MMOL/L
PROT SERPL-MCNC: 7.2 G/DL
PROTEIN URINE: NEGATIVE MG/DL
RED BLOOD CELLS URINE: 2 /HPF
SODIUM SERPL-SCNC: 140 MMOL/L
SPECIFIC GRAVITY URINE: 1.02
TRIGL SERPL-MCNC: 88 MG/DL
UROBILINOGEN URINE: 0.2 MG/DL
WHITE BLOOD CELLS URINE: 1 /HPF

## 2024-08-01 NOTE — PROGRESS NOTE ADULT - ASSESSMENT
60 yo M no PMHx who presents for abnormal labs. Admitted for treatment of MSSA bacteremia.       Is This A New Presentation, Or A Follow-Up?: Skin Lesions What Type Of Note Output Would You Prefer (Optional)?: Bullet Format How Severe Is Your Skin Lesion?: mild Has Your Skin Lesion Been Treated?: not been treated

## 2024-08-20 ENCOUNTER — TRANSCRIPTION ENCOUNTER (OUTPATIENT)
Age: 59
End: 2024-08-20

## 2025-03-03 NOTE — PROVIDER CONTACT NOTE (CRITICAL VALUE NOTIFICATION) - SITUATION
Dr Sullivan bedside to speak with family bedside.    Patient complaining of pain. RN will give ibuprofen. Provided patient with popsicle and apple juice.    tm  
Family came back from pharmacy with medications, went to go get car.    Wctm,  
Per report, patient ready for discharge. Awaiting family to come back from pharmacy with new prescription medications.    Patient sleeping with mom.  tm  
blood culture for positive gram positive cocci in clusters
Blood cultures taken on 3/20 showed growth in anaerobic bottle with gram + cocci and clusters
Pt currently receiving cefazolin 2g q8h
